# Patient Record
Sex: FEMALE | Race: BLACK OR AFRICAN AMERICAN | Employment: UNEMPLOYED | URBAN - METROPOLITAN AREA
[De-identification: names, ages, dates, MRNs, and addresses within clinical notes are randomized per-mention and may not be internally consistent; named-entity substitution may affect disease eponyms.]

---

## 2017-08-16 ENCOUNTER — HOSPITAL ENCOUNTER (EMERGENCY)
Age: 47
Discharge: HOME | End: 2017-08-16
Attending: EMERGENCY MEDICINE
Payer: SELF-PAY

## 2017-08-16 VITALS
RESPIRATION RATE: 18 BRPM | OXYGEN SATURATION: 100 % | HEIGHT: 65 IN | SYSTOLIC BLOOD PRESSURE: 191 MMHG | WEIGHT: 293 LBS | DIASTOLIC BLOOD PRESSURE: 105 MMHG | BODY MASS INDEX: 48.82 KG/M2 | HEART RATE: 100 BPM | TEMPERATURE: 99.1 F

## 2017-08-16 DIAGNOSIS — N93.9 ABNORMAL UTERINE BLEEDING (AUB): Primary | ICD-10-CM

## 2017-08-16 DIAGNOSIS — R03.0 ELEVATED BLOOD PRESSURE READING: ICD-10-CM

## 2017-08-16 LAB
ANION GAP BLD CALC-SCNC: 17 MMOL/L (ref 10–20)
BUN BLD-MCNC: 9 MG/DL (ref 7–18)
CA-I BLD-MCNC: 1.2 MMOL/L (ref 1.12–1.32)
CHLORIDE BLD-SCNC: 101 MMOL/L (ref 100–108)
CO2 BLD-SCNC: 26 MMOL/L (ref 19–24)
CREAT UR-MCNC: 0.8 MG/DL (ref 0.6–1.3)
GLUCOSE BLD STRIP.AUTO-MCNC: 105 MG/DL (ref 74–106)
HCG UR QL: NEGATIVE
HCT VFR BLD CALC: 36 % (ref 36–49)
HGB BLD-MCNC: 12.2 G/DL (ref 12–16)
POTASSIUM BLD-SCNC: 3.9 MMOL/L (ref 3.5–5.5)
SERVICE CMNT-IMP: NORMAL
SODIUM BLD-SCNC: 139 MMOL/L (ref 136–145)
WET PREP GENITAL: NORMAL

## 2017-08-16 PROCEDURE — 80047 BASIC METABLC PNL IONIZED CA: CPT

## 2017-08-16 PROCEDURE — 87491 CHLMYD TRACH DNA AMP PROBE: CPT | Performed by: PHYSICIAN ASSISTANT

## 2017-08-16 PROCEDURE — 99284 EMERGENCY DEPT VISIT MOD MDM: CPT

## 2017-08-16 PROCEDURE — 81025 URINE PREGNANCY TEST: CPT | Performed by: EMERGENCY MEDICINE

## 2017-08-16 PROCEDURE — 87210 SMEAR WET MOUNT SALINE/INK: CPT | Performed by: PHYSICIAN ASSISTANT

## 2017-08-16 NOTE — Clinical Note
One or more blood pressure readings were noted elevated during your emergency department visit this date. Follow up for the elevated BP. Monitor and record BP readings to follow up with PCP.

## 2017-08-16 NOTE — ED TRIAGE NOTES
Provider in triage: vaginal bleeding for 4 weeks   Ordered: cbc hcg  DDx: dysfunctional uterine bleeding  Sent to (MAIN treatment area, FAST track): fast

## 2017-08-16 NOTE — DISCHARGE INSTRUCTIONS
Abnormal Uterine Bleeding: Care Instructions  Your Care Instructions  Abnormal uterine bleeding (AUB) is irregular bleeding from the uterus that is longer or heavier than usual or does not occur at your regular time. Sometimes it is caused by changes in hormone levels. It can also be caused by growths in the uterus, such as fibroids or polyps. Sometimes a cause cannot be found. You may have heavy bleeding when you are not expecting your period. Your doctor may suggest a pregnancy test, if you think you are pregnant. Follow-up care is a key part of your treatment and safety. Be sure to make and go to all appointments, and call your doctor if you are having problems. It's also a good idea to know your test results and keep a list of the medicines you take. How can you care for yourself at home? · Be safe with medicines. Take pain medicines exactly as directed. ¨ If the doctor gave you a prescription medicine for pain, take it as prescribed. ¨ If you are not taking a prescription pain medicine, ask your doctor if you can take an over-the-counter medicine. · You may be low in iron because of blood loss. Eat a balanced diet that is high in iron and vitamin C. Foods rich in iron include red meat, shellfish, eggs, beans, and leafy green vegetables. Talk to your doctor about whether you need to take iron pills or a multivitamin. When should you call for help? Call 911 anytime you think you may need emergency care. For example, call if:  · You passed out (lost consciousness). Call your doctor now or seek immediate medical care if:  · You have sudden, severe pain in your belly or pelvis. · You have severe vaginal bleeding. You are soaking through your usual pads or tampons every hour for 2 or more hours. · You feel dizzy or lightheaded, or you feel like you may faint. Watch closely for changes in your health, and be sure to contact your doctor if:  · You have new belly or pelvic pain.   · You have a fever.  · Your bleeding gets worse or lasts longer than 1 week. · You think you may be pregnant. Where can you learn more? Go to http://mahin-megan.info/. Enter X746 in the search box to learn more about \"Abnormal Uterine Bleeding: Care Instructions. \"  Current as of: October 13, 2016  Content Version: 11.3  © 3762-1449 [employer]. Care instructions adapted under license by [employer] (which disclaims liability or warranty for this information). If you have questions about a medical condition or this instruction, always ask your healthcare professional. [place] [address] any warranty or liability for your use of this information.

## 2017-08-16 NOTE — ED PROVIDER NOTES
Patient is a 49 y.o. female presenting with vaginal bleeding. The history is provided by the patient. Vaginal Bleeding     Marga Rivera is a 49 y.o. female history of panic attacks, DM, presents with c/o vaginal bleeding for the past four weeks. States heavy bleeding at times. Denies fever or n/v.     Past Medical History:   Diagnosis Date    Panic attacks     Type II or unspecified type diabetes mellitus without mention of complication, not stated as uncontrolled     Vitamin D deficiency 2016       Past Surgical History:   Procedure Laterality Date    HX  SECTION  4965,5998    HX GYN      HX HEENT  Childhood    HX TUBAL LIGATION           Family History:   Problem Relation Age of Onset    Diabetes Mother     Diabetes Father     Hypertension Sister        Social History     Social History    Marital status: SINGLE     Spouse name: N/A    Number of children: N/A    Years of education: N/A     Occupational History    Not on file. Social History Main Topics    Smoking status: Never Smoker    Smokeless tobacco: Never Used      Comment: Pt counseled to continue to not smoke.  Alcohol use No    Drug use: No    Sexual activity: Yes     Partners: Male     Birth control/ protection: Surgical     Other Topics Concern    Not on file     Social History Narrative         ALLERGIES: Review of patient's allergies indicates no known allergies. Review of Systems   Genitourinary: Positive for vaginal bleeding. Constitutional:  Denies malaise, fever, chills. Head:  Denies injury. Face:  Denies injury or pain. ENMT:  Denies sore throat. Neck:  Denies injury or pain. Chest:  Denies injury. Cardiac:  Denies chest pain or palpitations. Respiratory:  Denies cough, wheezing, difficulty breathing, shortness of breath. GI/ABD:  Denies injury, pain, distention, nausea, vomiting, diarrhea. :  Vaginal bleeding Denies injury, pain, dysuria or urgency.    Back:  Denies injury or pain.   Pelvis:  Denies injury or pain. Extremity/MS:  Denies injury or pain. Neuro:  Denies headache, LOC, dizziness, neurologic symptoms/deficits/paresthesias. Skin: Denies injury, rash, itching or skin changes. Vitals:    08/16/17 1911   BP: (!) 191/105   Pulse: 100   Resp: 18   Temp: 99.1 °F (37.3 °C)   SpO2: 100%   Weight: 149.7 kg (330 lb)   Height: 5' 5\" (1.651 m)            Physical Exam   Nursing note and vitals reviewed. CONSTITUTIONAL: Alert, in no apparent distress; well-developed; well-nourished. HEAD:  Normocephalic, atraumatic. EYES: PERRL; EOM's intact. ENTM: Nose: No rhinorrhea; Throat: mucous membranes moist. Posterior pharynx-normal.  Neck:  No JVD, supple without lymphadenopathy. RESP: Chest clear, equal breath sounds. CV: S1 and S2 WNL; No murmurs, gallops or rubs. GI: Abdomen soft and non-tender. No masses or organomegaly. UPPER EXT:  Normal inspection. LOWER EXT: Normal inspection. NEURO: strength 5/5 and sym, sensation intact. SKIN: No rashes; Normal for age and stage. PSYCH:  Alert and oriented, normal affect. MDM  Number of Diagnoses or Management Options  Diagnosis management comments: DDx: preg, ectopic, miscarriage, menstruation, ovarian cyst, ovarian torsion, tubo-ovarian abscess, fibroids, UTI, pyelo, kidney stones, STD,  Vxoc-Enqm-Exdvjc syndrome, hemorroids, dehydration,anemia, malignancy         Amount and/or Complexity of Data Reviewed  Clinical lab tests: ordered and reviewed      ED Course       Pelvic Exam  Date/Time: 8/16/2017 7:43 PM  Performed by: PA  Exam assisted by:  Nurse. Type of exam performed: bimanual and speculum. External genitalia appearance: normal.    Vaginal exam:  bleeding. Bleeding: mild  Cervical exam:  normal.    Specimen(s) collected:  GC.   Bimanual exam:  normal.            Vitals:  Patient Vitals for the past 12 hrs:   Temp Pulse Resp BP SpO2   08/16/17 1911 99.1 °F (37.3 °C) 100 18 (!) 191/105 100 % Medications ordered:   Medications - No data to display      Lab findings:  Recent Results (from the past 12 hour(s))   HCG URINE, QL    Collection Time: 08/16/17  7:10 PM   Result Value Ref Range    HCG urine, Ql. NEGATIVE  NEG     WET PREP    Collection Time: 08/16/17  7:30 PM   Result Value Ref Range    Special Requests: NO SPECIAL REQUESTS      Wet prep NO YEAST,TRICHOMONAS OR CLUE CELLS NOTED         EKG interpretation by ED Physician:      X-Ray, CT or other radiology findings or impressions:  No orders to display       Progress notes, Consult notes or additional Procedure notes:       Disposition:  Diagnosis:   1. Abnormal uterine bleeding (AUB)    2. Elevated blood pressure reading        Disposition:   8:04 PM  Pt reevaluated at this time and is resting comfortably in NAD. Discussed results and findings, as well as, diagnosis and plan for discharge. Pt verbalizes understanding and agreement with plan. All questions addressed at this time.      Follow-up Information     Follow up With Details Comments Contact Info    Sina Roa MD Schedule an appointment as soon as possible for a visit in 3 days  [facility] [address]  [facility] [address] [facility] Linnette Reilly DO Schedule an appointment as soon as possible for a visit in 2 days  [id number] [facility]  [address] [facility] EMERGENCY DEPT  If symptoms worsen [address]  [phone]           Patient's Medications   Start Taking    No medications on file   Continue Taking    No medications on file   These Medications have changed    No medications on file   Stop Taking    BLOOD-GLUCOSE METER (FREESTYLE LITE METER) MONITORING KIT    Check fasting sugars daily before breakfast DX code:250.00 (freedom freestyle liter meter)    GLUCOSE BLOOD VI TEST STRIPS (FREESTYLE TEST) STRIP    Check fasting sugars daily before breakfast DX code:250.00 (freedom freestyle liter meter)    HYDROXYZINE (VISTARIL) 25 MG CAPSULE    Take 1 Cap by mouth three (3) times daily as needed for Itching.     LANCETS MISC    Check fasting sugars daily before breakfast DX code:250.00 (freedom freestyle liter meter)

## 2017-08-17 LAB
C TRACH RRNA SPEC QL NAA+PROBE: NEGATIVE
N GONORRHOEA RRNA SPEC QL NAA+PROBE: NEGATIVE
SPECIMEN SOURCE: NORMAL

## 2017-10-03 ENCOUNTER — HOSPITAL ENCOUNTER (EMERGENCY)
Age: 47
Discharge: HOME | End: 2017-10-03
Attending: EMERGENCY MEDICINE
Payer: SELF-PAY

## 2017-10-03 VITALS
RESPIRATION RATE: 18 BRPM | SYSTOLIC BLOOD PRESSURE: 139 MMHG | DIASTOLIC BLOOD PRESSURE: 86 MMHG | HEIGHT: 65 IN | TEMPERATURE: 98.3 F | OXYGEN SATURATION: 99 % | WEIGHT: 293 LBS | BODY MASS INDEX: 48.82 KG/M2 | HEART RATE: 96 BPM
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
## 2017-10-03 DIAGNOSIS — S39.012A BACK STRAIN, INITIAL ENCOUNTER: Primary | ICD-10-CM

## 2017-10-03 PROCEDURE — 99282 EMERGENCY DEPT VISIT SF MDM: CPT

## 2017-10-03 RX ORDER — CYCLOBENZAPRINE HCL 5 MG
10 TABLET ORAL 3 TIMES DAILY
Qty: 18 TAB | Refills: 0 | Status: SHIPPED | OUTPATIENT
Start: 2017-10-03 | End: 2017-10-06

## 2017-10-03 RX ORDER — IBUPROFEN 800 MG/1
800 TABLET ORAL EVERY 8 HOURS
Qty: 15 TAB | Refills: 0 | Status: SHIPPED | OUTPATIENT
Start: 2017-10-03 | End: 2017-10-08
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
## 2017-10-04 ENCOUNTER — TELEPHONE (OUTPATIENT)
Dept: INTERNAL MEDICINE CLINIC | Age: 47
End: 2017-10-04

## 2017-10-04 NOTE — TELEPHONE ENCOUNTER
2 pt. Identifiers confirmed. Pt. Notified of below. PT. Transferred to make ER f/u. No other questions/concerns at this time.

## 2017-10-04 NOTE — ED NOTES
Pt discharged to home ambulatory and in company of self  Discharge instructions provided via discussion and handout. Teaching to patient   Verbalized understanding. No questions voiced. Discharged with 2 RX.

## 2017-10-09 ENCOUNTER — OFFICE VISIT (OUTPATIENT)
Dept: INTERNAL MEDICINE CLINIC | Age: 47
End: 2017-10-09

## 2017-10-09 VITALS
HEIGHT: 65 IN | BODY MASS INDEX: 48.82 KG/M2 | HEART RATE: 78 BPM | OXYGEN SATURATION: 100 % | DIASTOLIC BLOOD PRESSURE: 81 MMHG | RESPIRATION RATE: 18 BRPM | SYSTOLIC BLOOD PRESSURE: 125 MMHG | TEMPERATURE: 97.5 F | WEIGHT: 293 LBS

## 2017-10-09 DIAGNOSIS — E11.9 DIABETES MELLITUS, STABLE (HCC): ICD-10-CM

## 2017-10-09 DIAGNOSIS — M54.9 MID BACK PAIN ON RIGHT SIDE: Primary | ICD-10-CM

## 2017-10-09 DIAGNOSIS — M72.2 PLANTAR FASCIITIS OF RIGHT FOOT: ICD-10-CM

## 2017-10-09 RX ORDER — IBUPROFEN 800 MG/1
800 TABLET ORAL
COMMUNITY
End: 2018-02-26

## 2017-10-09 RX ORDER — IBUPROFEN 200 MG
800 TABLET ORAL
COMMUNITY
End: 2017-10-09 | Stop reason: CLARIF

## 2017-10-09 RX ORDER — CYCLOBENZAPRINE HCL 5 MG
5 TABLET ORAL
COMMUNITY
End: 2018-02-26
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
## 2017-10-09 NOTE — PROGRESS NOTES
ROOM # 2    Marylen Peasant presents today for   Chief Complaint   Patient presents with    Back Pain     [facility] f/u for back pain    Foot Pain     right foot pain       Marylen Peasant preferred language for health care discussion is english/other. Is someone accompanying this pt? no    Is the patient using any DME equipment during OV? no    Depression Screening:  PHQ over the last two weeks 10/9/2017 7/19/2016 4/15/2016 2/13/2015   Little interest or pleasure in doing things Not at all Not at all Not at all Not at all   Feeling down, depressed or hopeless Not at all Not at all Not at all Not at all   Total Score PHQ 2 0 0 0 0       Learning Assessment:  Learning Assessment 2/13/2015   PRIMARY LEARNER Patient   HIGHEST LEVEL OF EDUCATION - PRIMARY LEARNER  DID NOT GRADUATE [facility] PRIMARY LEARNER NONE   CO-LEARNER CAREGIVER No   PRIMARY LANGUAGE ENGLISH   LEARNER PREFERENCE PRIMARY DEMONSTRATION   ANSWERED BY Patient   RELATIONSHIP SELF       Abuse Screening:  Abuse Screening Questionnaire 10/9/2017   Do you ever feel afraid of your partner? N   Are you in a relationship with someone who physically or mentally threatens you? N   Is it safe for you to go home? Y       Fall Risk  No flowsheet data found. Health Maintenance reviewed and discussed per provider. Yes    Marylen Peasant is due for eye exam (record request), foot exam, lipid, a1c, microalbumin . Please order/place referral if appropriate. Advance Directive:  1. Do you have an advance directive in place? Patient Reply: no    2. If not, would you like material regarding how to put one in place? Patient Reply: no    Coordination of Care:  1. Have you been to the ER, urgent care clinic since your last visit? Hospitalized since your last visit? [facility] for back pain    2. Have you seen or consulted any other health care providers outside of the [address] [place] [address] since your last visit? Include any pap smears or colon screening.  no

## 2017-10-09 NOTE — MR AVS SNAPSHOT
Visit Information Date & Time Provider Department Dept. Phone Encounter #  
 10/9/2017  8:30 AM Stewart Crowell MD [facility] [phone] [id number] Follow-up Instructions Return in about 1 month (around 11/9/2017) for f/u DM. Upcoming Health Maintenance Date Due  
 FOOT EXAM Q1 4/17/1979 EYE EXAM RETINAL OR DILATED Q1 4/17/1979 HEMOGLOBIN A1C Q6M 10/15/2016 MICROALBUMIN Q1 4/15/2017 LIPID PANEL Q1 4/15/2017 Pneumococcal 19-64 Medium Risk (1 of 1 - PPSV23) 1/1/2018* DTaP/Tdap/Td series (1 - Tdap) 1/1/2018* PAP AKA CERVICAL CYTOLOGY 3/18/2018 *Topic was postponed. The date shown is not the original due date. Allergies as of 10/9/2017  Review Complete On: 10/9/2017 By: Stewart Crowell MD  
 No Known Allergies Current Immunizations  Reviewed on 7/19/2016 Name Date  
 TB Skin Test (PPD) Intradermal 9/20/2017 12:00 AM  
  
 Not reviewed this visit You Were Diagnosed With   
  
 Codes Comments Mid back pain on right side    -  Primary ICD-10-CM: M54.9 ICD-9-CM: 724.5 Plantar fasciitis of right foot     ICD-10-CM: M72.2 ICD-9-CM: 728.71 Vitals BP Pulse Temp Resp Height(growth percentile) Weight(growth percentile) 125/81 (BP 1 Location: Right arm, BP Patient Position: Sitting) 78 97.5 °F (36.4 °C) (Oral) 18 5' 5\" (1.651 m) 342 lb 6.4 oz (155.3 kg) SpO2 BMI OB Status Smoking Status 100% 56.98 kg/m2 Unknown Never Smoker Vitals History BMI and BSA Data Body Mass Index Body Surface Area 56.98 kg/m 2 2.67 m 2 Preferred Pharmacy Pharmacy Name Phone [id number] [place], [address] [place] [address] [phone] Your Updated Medication List  
  
   
This list is accurate as of: 10/9/17  8:47 AM.  Always use your most recent med list.  
  
  
  
  
 cyclobenzaprine 5 mg tablet Commonly known as:  FLEXERIL  
 Take 5 mg by mouth every six (6) hours as needed for Muscle Spasm(s). ibuprofen 800 mg tablet Commonly known as:  MOTRIN Take 800 mg by mouth every eight (8) hours as needed for Pain. Follow-up Instructions Return in about 1 month (around 11/9/2017) for f/u DM. Patient Instructions 1) get good foot support, Dr. Hernandez Syed pads/ 
 
2) use heating pad, icy/hot, tiger balm for pain. 3) Take  Ibuprofen With food. If you notice any blood/black tarry stools, diarrhea, abdominal pain, nausea, vomiting then stop medication immediately. Give us a call ASAP. Back Stretches: Exercises Your Care Instructions Here are some examples of exercises for stretching your back. Start each exercise slowly. Ease off the exercise if you start to have pain. Your doctor or physical therapist will tell you when you can start these exercises and which ones will work best for you. How to do the exercises Overhead stretch 1. Stand comfortably with your feet shoulder-width apart. 2. Looking straight ahead, raise both arms over your head and reach toward the ceiling. Do not allow your head to tilt back. 3. Hold for 15 to 30 seconds, then lower your arms to your sides. 4. Repeat 2 to 4 times. Side stretch 1. Stand comfortably with your feet shoulder-width apart. 2. Raise one arm over your head, and then lean to the other side. 3. Slide your hand down your leg as you let the weight of your arm gently stretch your side muscles. Hold for 15 to 30 seconds. 4. Repeat 2 to 4 times on each side. Press-up 1. Lie on your stomach, supporting your body with your forearms. 2. Press your elbows down into the floor to raise your upper back. As you do this, relax your stomach muscles and allow your back to arch without using your back muscles. As your press up, do not let your hips or pelvis come off the floor. 3. Hold for 15 to 30 seconds, then relax. 4. Repeat 2 to 4 times. Relax and rest 
 
1. Lie on your back with a rolled towel under your neck and a pillow under your knees. Extend your arms comfortably to your sides. 2. Relax and breathe normally. 3. Remain in this position for about 10 minutes. 4. If you can, do this 2 or 3 times each day. Follow-up care is a key part of your treatment and safety. Be sure to make and go to all appointments, and call your doctor if you are having problems. It's also a good idea to know your test results and keep a list of the medicines you take. Where can you learn more? Go to http://mahin-megan.info/. Enter V128 in the search box to learn more about \"Back Stretches: Exercises. \" Current as of: March 21, 2017 Content Version: 11.3 © 3369-9895 [employer]. Care instructions adapted under license by [employer] (which disclaims liability or warranty for this information). If you have questions about a medical condition or this instruction, always ask your healthcare professional. [place] [address] any warranty or liability for your use of this information. Healthy Upper Back: Exercises Your Care Instructions Here are some examples of exercises for your upper back. Start each exercise slowly. Ease off the exercise if you start to have pain. Your doctor or physical therapist will tell you when you can start these exercises and which ones will work best for you. How to do the exercises Lower neck and upper back stretch 5. Stretch your arms out in front of your body. Clasp one hand on top of your other hand. 6. Gently reach out so that you feel your shoulder blades stretching away from each other. 7. Gently bend your head forward. 8. Hold for 15 to 30 seconds. 9. Repeat 2 to 4 times. Midback stretch Note: If you have knee pain, do not do this exercise. 5. Kneel on the floor, and sit back on your ankles. 6. Lean forward, place your hands on the floor, and stretch your arms out in front of you. Rest your head between your arms. 7. Gently push your chest toward the floor, reaching as far in front of you as possible. 8. Hold for 15 to 30 seconds. 9. Repeat 2 to 4 times. Shoulder rolls 5. Sit comfortably with your feet shoulder-width apart. You can also do this exercise while standing. 6. Roll your shoulders up, then back, and then down in a smooth, circular motion. 7. Repeat 2 to 4 times. Wall push-up 5. Stand against a wall with your feet about 12 to 24 inches back from the wall. If you feel any pain when you do this exercise, stand closer to the wall. 6. Place your hands on the wall slightly wider apart than your shoulders, and lean forward. 7. Gently lean your body toward the wall. Then push back to your starting position. Keep the motion smooth and controlled. 8. Repeat 8 to 12 times. Resisted shoulder blade squeeze Note: For this exercise, you will need elastic exercise material, such as surgical tubing or Thera-Band. 1. Sit or stand, holding the band in both hands in front of you. Keep your elbows close to your sides, bent at a 90-degree angle. Your palms should face up. 2. Squeeze your shoulder blades together, and move your arms to the outside, stretching the band. Be sure to keep your elbows at your sides while you do this. 3. Relax. 4. Repeat 8 to 12 times. Resisted rows Note: For this exercise, you will need elastic exercise material, such as surgical tubing or Thera-Band. 1. Put the band around a solid object, such as a bedpost, at about waist level. Hold one end of the band in each hand. 2. With your elbows at your sides and bent to 90 degrees, pull the band back to move your shoulder blades toward each other. Return to the starting position. 3. Repeat 8 to 12 times. Follow-up care is a key part of your treatment and safety.  Be sure to make and go to all appointments, and call your doctor if you are having problems. It's also a good idea to know your test results and keep a list of the medicines you take. Where can you learn more? Go to http://mahin-megan.info/. Enter H979 in the search box to learn more about \"Healthy Upper Back: Exercises. \" Current as of: March 21, 2017 Content Version: 11.3 © 8309-0445 [employer]. Care instructions adapted under license by [employer] (which disclaims liability or warranty for this information). If you have questions about a medical condition or this instruction, always ask your healthcare professional. [address] any warranty or liability for your use of this information. Plantar Fasciitis: Care Instructions Your Care Instructions Plantar fasciitis is pain and inflammation of the plantar fascia, the tissue at the bottom of your foot that connects the heel bone to the toes. The plantar fascia also supports the arch. If you strain the plantar fascia, it can develop small tears and cause heel pain when you stand or walk. Plantar fasciitis can be caused by running or other sports. It also may occur in people who are overweight or who have high arches or flat feet. You may get plantar fasciitis if you walk or stand for long periods, or have a tight Achilles tendon or calf muscles. You can improve your foot pain with rest and other care at home. It might take a few weeks to a few months for your foot to heal completely. Follow-up care is a key part of your treatment and safety. Be sure to make and go to all appointments, and call your doctor if you are having problems. It's also a good idea to know your test results and keep a list of the medicines you take. How can you care for yourself at home? · Rest your feet often. Reduce your activity to a level that lets you avoid pain. If possible, do not run or walk on hard surfaces. · Take pain medicines exactly as directed. ¨ If the doctor gave you a prescription medicine for pain, take it as prescribed. ¨ If you are not taking a prescription pain medicine, take an over-the-counter anti-inflammatory medicine for pain and swelling, such as ibuprofen (Advil, Motrin) or naproxen (Aleve). Read and follow all instructions on the label. · Use ice massage to help with pain and swelling. You can use an ice cube or an ice cup several times a day. To make an ice cup, fill a paper cup with water and freeze it. Cut off the top of the cup until a half-inch of ice shows. Hold onto the remaining paper to use the cup. Rub the ice in small circles over the area for 5 to 7 minutes. · Contrast baths, which alternate hot and cold water, can also help reduce swelling. But because heat alone may make pain and swelling worse, end a contrast bath with a soak in cold water. · Wear a night splint if your doctor suggests it. A night splint holds your foot with the toes pointed up and the foot and ankle at a 90-degree angle. This position gives the bottom of your foot a constant, gentle stretch. · Do simple exercises such as calf stretches and towel stretches 2 to 3 times each day, especially when you first get up in the morning. These can help the plantar fascia become more flexible. They also make the muscles that support your arch stronger. Hold these stretches for 15 to 30 seconds per stretch. Repeat 2 to 4 times. ¨ Stand about 1 foot from a wall. Place the palms of both hands against the wall at chest level. Lean forward against the wall, keeping one leg with the knee straight and heel on the ground while bending the knee of the other leg. ¨ Sit down on the floor or a mat with your feet stretched in front of you. Roll up a towel lengthwise, and loop it over the ball of your foot. Holding the towel at both ends, gently pull the towel toward you to stretch your foot. · Wear shoes with good arch support. Athletic shoes or shoes with a well-cushioned sole are good choices. · Try heel cups or shoe inserts (orthotics) to help cushion your heel. You can buy these at many shoe stores. · Put on your shoes as soon as you get out of bed. Going barefoot or wearing slippers may make your pain worse. · Reach and stay at a good weight for your height. This puts less strain on your feet. When should you call for help? Call your doctor now or seek immediate medical care if: 
· You have heel pain with fever, redness, or warmth in your heel. · You cannot put weight on the sore foot. Watch closely for changes in your health, and be sure to contact your doctor if: 
· You have numbness or tingling in your heel. · Your heel pain lasts more than 2 weeks. Where can you learn more? Go to http://mahinTerressentiamegan.info/. Jackson Been in the search box to learn more about \"Plantar Fasciitis: Care Instructions. \" Current as of: March 21, 2017 Content Version: 11.3 © 2199-0818 Kiddie Kist. Care instructions adapted under license by [employer] (which disclaims liability or warranty for this information). If you have questions about a medical condition or this instruction, always ask your healthcare professional. [address] any warranty or liability for your use of this information. Plantar Fasciitis: Exercises Your Care Instructions Here are some examples of typical rehabilitation exercises for your condition. Start each exercise slowly. Ease off the exercise if you start to have pain. Your doctor or physical therapist will tell you when you can start these exercises and which ones will work best for you. How to do the exercises Note: Each exercise should create a pulling feeling but should not cause pain. Towel stretch 10. Sit with your legs extended and knees straight. 11. Place a towel around your foot just under the toes. 12. Hold each end of the towel in each hand, with your hands above your knees. 13. Pull back with the towel so that your foot stretches toward you. 14. Hold the position for at least 15 to 30 seconds. 15. Repeat 2 to 4 times a session, up to 5 sessions a day. Calf stretch Note: This exercise stretches the muscles at the back of the lower leg (the calf) and the Achilles tendon. Do this exercise 3 or 4 times a day, 5 days a week. 10. Stand facing a wall with your hands on the wall at about eye level. Put the leg you want to stretch about a step behind your other leg. 11. Keeping your back heel on the floor, bend your front knee until you feel a stretch in the back leg. 12. Hold the stretch for 15 to 30 seconds. Repeat 2 to 4 times. Plantar fascia and calf stretch Note: Stretching the plantar fascia and calf muscles can increase flexibility and decrease heel pain. You can do this exercise several times each day and before and after activity. 8. Stand on a step as shown above. Be sure to hold on to the banister. 9. Slowly let your heels down over the edge of the step as you relax your calf muscles. You should feel a gentle stretch across the bottom of your foot and up the back of your leg to your knee. 10. Hold the stretch about 15 to 30 seconds, and then tighten your calf muscle a little to bring your heel back up to the level of the step. Repeat 2 to 4 times. Towel curls 9. While sitting, place your foot on a towel on the floor and scrunch the towel toward you with your toes. 10. Then, also using your toes, push the towel away from you. Note: Make this exercise more challenging by placing a weighted object, such as a soup can, on the other end of the towel. [place] pickups 5. Put marbles on the floor next to a cup. 
6. Using your toes, try to lift the marbles up from the floor and put them in the cup. Follow-up care is a key part of your treatment and safety. Be sure to make and go to all appointments, and call your doctor if you are having problems. It's also a good idea to know your test results and keep a list of the medicines you take. Where can you learn more? Go to http://mahin-megan.info/. Ceci Or in the search box to learn more about \"Plantar Fasciitis: Exercises. \" Current as of: March 21, 2017 Content Version: 11.3 © 0129-1058 [employer]. Care instructions adapted under license by [employer] (which disclaims liability or warranty for this information). If you have questions about a medical condition or this instruction, always ask your healthcare professional. [address] any warranty or liability for your use of this information. Introducing [facility]! Sarah Rodriguez introduces [employer] patient portal. Now you can access parts of your medical record, email your doctor's office, and request medication refills online. 1. In your internet browser, go to [email or web address] 2. Click on the First Time User? Click Here link in the Sign In box. You will see the New Member Sign Up page. 3. Enter your [employer] Access Code exactly as it appears below. You will not need to use this code after youve completed the sign-up process. If you do not sign up before the expiration date, you must request a new code. · [employer] Access Code: [email or web address] Expires: 11/14/2017  8:04 PM 
 
4. Enter the last four digits of your Social Security Number (xxxx) and Date of Birth (mm/dd/yyyy) as indicated and click Submit. You will be taken to the next sign-up page. 5. Create a Evergramt ID. This will be your [employer] login ID and cannot be changed, so think of one that is secure and easy to remember. 6. Create a Evergramt password. You can change your password at any time. 7. Enter your Password Reset Question and Answer. This can be used at a later time if you forget your password. 8. Enter your e-mail address. You will receive e-mail notification when new information is available in [address] 9. Click Sign Up. You can now view and download portions of your medical record. 10. Click the Download Summary menu link to download a portable copy of your medical information. If you have questions, please visit the Frequently Asked Questions section of the B[employer]park website. Remember, [employer] is NOT to be used for urgent needs. For medical emergencies, dial 911. Now available from your iPhone and Android! Please provide this summary of care documentation to your next provider. Your primary care clinician is listed as Sina Roa. If you have any questions after today's visit, please call [phone].

## 2017-10-09 NOTE — PATIENT INSTRUCTIONS
1) get good foot support, Dr. Geni Barragan pads/    2) use heating pad, icy/hot, tiger balm for pain. 3) Take  Ibuprofen With food. If you notice any blood/black tarry stools, diarrhea, abdominal pain, nausea, vomiting then stop medication immediately. Give us a call ASAP. Back Stretches: Exercises  Your Care Instructions  Here are some examples of exercises for stretching your back. Start each exercise slowly. Ease off the exercise if you start to have pain. Your doctor or physical therapist will tell you when you can start these exercises and which ones will work best for you. How to do the exercises  Overhead stretch    1. Stand comfortably with your feet shoulder-width apart. 2. Looking straight ahead, raise both arms over your head and reach toward the ceiling. Do not allow your head to tilt back. 3. Hold for 15 to 30 seconds, then lower your arms to your sides. 4. Repeat 2 to 4 times. Side stretch    1. Stand comfortably with your feet shoulder-width apart. 2. Raise one arm over your head, and then lean to the other side. 3. Slide your hand down your leg as you let the weight of your arm gently stretch your side muscles. Hold for 15 to 30 seconds. 4. Repeat 2 to 4 times on each side. Press-up    1. Lie on your stomach, supporting your body with your forearms. 2. Press your elbows down into the floor to raise your upper back. As you do this, relax your stomach muscles and allow your back to arch without using your back muscles. As your press up, do not let your hips or pelvis come off the floor. 3. Hold for 15 to 30 seconds, then relax. 4. Repeat 2 to 4 times. Relax and rest    1. Lie on your back with a rolled towel under your neck and a pillow under your knees. Extend your arms comfortably to your sides. 2. Relax and breathe normally. 3. Remain in this position for about 10 minutes. 4. If you can, do this 2 or 3 times each day.   Follow-up care is a key part of your treatment and safety. Be sure to make and go to all appointments, and call your doctor if you are having problems. It's also a good idea to know your test results and keep a list of the medicines you take. Where can you learn more? Go to http://mahin-megan.info/. Enter M721 in the search box to learn more about \"Back Stretches: Exercises. \"  Current as of: March 21, 2017  Content Version: 11.3  © 0024-4156 [employer]. Care instructions adapted under license by [employer] (which disclaims liability or warranty for this information). If you have questions about a medical condition or this instruction, always ask your healthcare professional. [address] any warranty or liability for your use of this information. Healthy Upper Back: Exercises  Your Care Instructions  Here are some examples of exercises for your upper back. Start each exercise slowly. Ease off the exercise if you start to have pain. Your doctor or physical therapist will tell you when you can start these exercises and which ones will work best for you. How to do the exercises  Lower neck and upper back stretch    5. Stretch your arms out in front of your body. Clasp one hand on top of your other hand. 6. Gently reach out so that you feel your shoulder blades stretching away from each other. 7. Gently bend your head forward. 8. Hold for 15 to 30 seconds. 9. Repeat 2 to 4 times. Midback stretch    Note: If you have knee pain, do not do this exercise. 5. Kneel on the floor, and sit back on your ankles. 6. Lean forward, place your hands on the floor, and stretch your arms out in front of you. Rest your head between your arms. 7. Gently push your chest toward the floor, reaching as far in front of you as possible. 8. Hold for 15 to 30 seconds. 9. Repeat 2 to 4 times. Shoulder rolls    5. Sit comfortably with your feet shoulder-width apart.  You can also do this exercise while standing. 6. Roll your shoulders up, then back, and then down in a smooth, circular motion. 7. Repeat 2 to 4 times. Wall push-up    5. Stand against a wall with your feet about 12 to 24 inches back from the wall. If you feel any pain when you do this exercise, stand closer to the wall. 6. Place your hands on the wall slightly wider apart than your shoulders, and lean forward. 7. Gently lean your body toward the wall. Then push back to your starting position. Keep the motion smooth and controlled. 8. Repeat 8 to 12 times. Resisted shoulder blade squeeze    Note: For this exercise, you will need elastic exercise material, such as surgical tubing or Thera-Band. 1. Sit or stand, holding the band in both hands in front of you. Keep your elbows close to your sides, bent at a 90-degree angle. Your palms should face up. 2. Squeeze your shoulder blades together, and move your arms to the outside, stretching the band. Be sure to keep your elbows at your sides while you do this. 3. Relax. 4. Repeat 8 to 12 times. Resisted rows    Note: For this exercise, you will need elastic exercise material, such as surgical tubing or Thera-Band. 1. Put the band around a solid object, such as a bedpost, at about waist level. Hold one end of the band in each hand. 2. With your elbows at your sides and bent to 90 degrees, pull the band back to move your shoulder blades toward each other. Return to the starting position. 3. Repeat 8 to 12 times. Follow-up care is a key part of your treatment and safety. Be sure to make and go to all appointments, and call your doctor if you are having problems. It's also a good idea to know your test results and keep a list of the medicines you take. Where can you learn more? Go to http://mahin-megan.info/. Enter Z333 in the search box to learn more about \"Healthy Upper Back: Exercises. \"  Current as of: March 21, 2017  Content Version: 11.3  © 0831-0329 [employer]. Care instructions adapted under license by [employer] (which disclaims liability or warranty for this information). If you have questions about a medical condition or this instruction, always ask your healthcare professional. [address] any warranty or liability for your use of this information. Plantar Fasciitis: Care Instructions  Your Care Instructions    Plantar fasciitis is pain and inflammation of the plantar fascia, the tissue at the bottom of your foot that connects the heel bone to the toes. The plantar fascia also supports the arch. If you strain the plantar fascia, it can develop small tears and cause heel pain when you stand or walk. Plantar fasciitis can be caused by running or other sports. It also may occur in people who are overweight or who have high arches or flat feet. You may get plantar fasciitis if you walk or stand for long periods, or have a tight Achilles tendon or calf muscles. You can improve your foot pain with rest and other care at home. It might take a few weeks to a few months for your foot to heal completely. Follow-up care is a key part of your treatment and safety. Be sure to make and go to all appointments, and call your doctor if you are having problems. It's also a good idea to know your test results and keep a list of the medicines you take. How can you care for yourself at home? · Rest your feet often. Reduce your activity to a level that lets you avoid pain. If possible, do not run or walk on hard surfaces. · Take pain medicines exactly as directed. ¨ If the doctor gave you a prescription medicine for pain, take it as prescribed. ¨ If you are not taking a prescription pain medicine, take an over-the-counter anti-inflammatory medicine for pain and swelling, such as ibuprofen (Advil, Motrin) or naproxen (Aleve). Read and follow all instructions on the label. · Use ice massage to help with pain and swelling.  You can use an ice cube or an ice cup several times a day. To make an ice cup, fill a paper cup with water and freeze it. Cut off the top of the cup until a half-inch of ice shows. Hold onto the remaining paper to use the cup. Rub the ice in small circles over the area for 5 to 7 minutes. · Contrast baths, which alternate hot and cold water, can also help reduce swelling. But because heat alone may make pain and swelling worse, end a contrast bath with a soak in cold water. · Wear a night splint if your doctor suggests it. A night splint holds your foot with the toes pointed up and the foot and ankle at a 90-degree angle. This position gives the bottom of your foot a constant, gentle stretch. · Do simple exercises such as calf stretches and towel stretches 2 to 3 times each day, especially when you first get up in the morning. These can help the plantar fascia become more flexible. They also make the muscles that support your arch stronger. Hold these stretches for 15 to 30 seconds per stretch. Repeat 2 to 4 times. ¨ Stand about 1 foot from a wall. Place the palms of both hands against the wall at chest level. Lean forward against the wall, keeping one leg with the knee straight and heel on the ground while bending the knee of the other leg. ¨ Sit down on the floor or a mat with your feet stretched in front of you. Roll up a towel lengthwise, and loop it over the ball of your foot. Holding the towel at both ends, gently pull the towel toward you to stretch your foot. · Wear shoes with good arch support. Athletic shoes or shoes with a well-cushioned sole are good choices. · Try heel cups or shoe inserts (orthotics) to help cushion your heel. You can buy these at many shoe stores. · Put on your shoes as soon as you get out of bed. Going barefoot or wearing slippers may make your pain worse. · Reach and stay at a good weight for your height. This puts less strain on your feet. When should you call for help?   Call your doctor now or seek immediate medical care if:  · You have heel pain with fever, redness, or warmth in your heel. · You cannot put weight on the sore foot. Watch closely for changes in your health, and be sure to contact your doctor if:  · You have numbness or tingling in your heel. · Your heel pain lasts more than 2 weeks. Where can you learn more? Go to http://mahin-megan.info/. Dean Comer in the search box to learn more about \"Plantar Fasciitis: Care Instructions. \"  Current as of: March 21, 2017  Content Version: 11.3  © 7113-7253 [employer]. Care instructions adapted under license by [employer] (which disclaims liability or warranty for this information). If you have questions about a medical condition or this instruction, always ask your healthcare professional. [address] any warranty or liability for your use of this information. Plantar Fasciitis: Exercises  Your Care Instructions  Here are some examples of typical rehabilitation exercises for your condition. Start each exercise slowly. Ease off the exercise if you start to have pain. Your doctor or physical therapist will tell you when you can start these exercises and which ones will work best for you. How to do the exercises  Note: Each exercise should create a pulling feeling but should not cause pain. Towel stretch    10. Sit with your legs extended and knees straight. 11. Place a towel around your foot just under the toes. 12. Hold each end of the towel in each hand, with your hands above your knees. 13. Pull back with the towel so that your foot stretches toward you. 14. Hold the position for at least 15 to 30 seconds. 15. Repeat 2 to 4 times a session, up to 5 sessions a day. Calf stretch    Note: This exercise stretches the muscles at the back of the lower leg (the calf) and the Achilles tendon.  Do this exercise 3 or 4 times a day, 5 days a week.  10. Stand facing a wall with your hands on the wall at about eye level. Put the leg you want to stretch about a step behind your other leg. 11. Keeping your back heel on the floor, bend your front knee until you feel a stretch in the back leg. 12. Hold the stretch for 15 to 30 seconds. Repeat 2 to 4 times. Plantar fascia and calf stretch    Note: Stretching the plantar fascia and calf muscles can increase flexibility and decrease heel pain. You can do this exercise several times each day and before and after activity. 8. Stand on a step as shown above. Be sure to hold on to the banister. 9. Slowly let your heels down over the edge of the step as you relax your calf muscles. You should feel a gentle stretch across the bottom of your foot and up the back of your leg to your knee. 10. Hold the stretch about 15 to 30 seconds, and then tighten your calf muscle a little to bring your heel back up to the level of the step. Repeat 2 to 4 times. Towel curls    9. While sitting, place your foot on a towel on the floor and scrunch the towel toward you with your toes. 10. Then, also using your toes, push the towel away from you. Note: Make this exercise more challenging by placing a weighted object, such as a soup can, on the other end of the towel. Melvin pickups    5. Put marbles on the floor next to a cup.  6. Using your toes, try to lift the marbles up from the floor and put them in the cup. Follow-up care is a key part of your treatment and safety. Be sure to make and go to all appointments, and call your doctor if you are having problems. It's also a good idea to know your test results and keep a list of the medicines you take. Where can you learn more? Go to http://mahin-megan.info/. Kristy Norris in the search box to learn more about \"Plantar Fasciitis: Exercises. \"  Current as of: March 21, 2017  Content Version: 11.3  © 1378-7364 [employer].  Care instructions adapted under license by [employer] (which disclaims liability or warranty for this information). If you have questions about a medical condition or this instruction, always ask your healthcare professional. [address] any warranty or liability for your use of this information.

## 2018-02-26 ENCOUNTER — HOSPITAL ENCOUNTER (EMERGENCY)
Age: 48
Discharge: HOME HEALTH | End: 2018-02-26
Attending: EMERGENCY MEDICINE
Payer: COMMERCIAL

## 2018-02-26 VITALS
SYSTOLIC BLOOD PRESSURE: 156 MMHG | WEIGHT: 293 LBS | HEART RATE: 89 BPM | OXYGEN SATURATION: 100 % | HEIGHT: 65 IN | BODY MASS INDEX: 48.82 KG/M2 | DIASTOLIC BLOOD PRESSURE: 98 MMHG | TEMPERATURE: 98.2 F | RESPIRATION RATE: 13 BRPM

## 2018-02-26 DIAGNOSIS — K08.89 PAIN, DENTAL: Primary | ICD-10-CM

## 2018-02-26 PROCEDURE — 99282 EMERGENCY DEPT VISIT SF MDM: CPT

## 2018-02-26 RX ORDER — HYDROCODONE BITARTRATE AND ACETAMINOPHEN 5; 325 MG/1; MG/1
TABLET ORAL
Qty: 6 TAB | Refills: 0 | Status: SHIPPED | OUTPATIENT
Start: 2018-02-26 | End: 2019-03-28

## 2018-02-26 RX ORDER — PENICILLIN V POTASSIUM 500 MG/1
500 TABLET, FILM COATED ORAL 4 TIMES DAILY
Qty: 28 TAB | Refills: 0 | Status: SHIPPED | OUTPATIENT
Start: 2018-02-26 | End: 2018-03-05

## 2018-02-27 NOTE — DISCHARGE INSTRUCTIONS
Tooth and Gum Pain: Care Instructions  Your Care Instructions    The most common causes of dental pain are tooth decay and gum disease. Pain can also be caused by an infection of the tooth (abscess) or the gums. Or you may have pain from a broken or cracked tooth. Other causes of pain include infection and damage to a tooth from nervous grinding of your teeth. A wisdom tooth can be painful when it is coming in but cannot break through the gum. It can also be painful when the tooth is only partway in and extra gum tissue has formed around it. The tissue can get inflamed (pericoronitis), and sometimes it gets infected. Prompt dental care can help find the cause of your toothache and keep the tooth from dying or gum disease from getting worse. Self-care at home may reduce your pain and discomfort. Follow-up care is a key part of your treatment and safety. Be sure to make and go to all appointments, and call your dentist or doctor if you are having problems. It's also a good idea to know your test results and keep a list of the medicines you take. How can you care for yourself at home? · To reduce pain and facial swelling, put an ice or cold pack on the outside of your cheek for 10 to 20 minutes at a time. Put a thin cloth between the ice and your skin. Do not use heat. · If your doctor prescribed antibiotics, take them as directed. Do not stop taking them just because you feel better. You need to take the full course of antibiotics. · Ask your doctor if you can take an over-the-counter pain medicine, such as acetaminophen (Tylenol), ibuprofen (Advil, Motrin), or naproxen (Aleve). Be safe with medicines. Read and follow all instructions on the label. · Avoid very hot, cold, or sweet foods and drinks if they increase your pain. · Rinse your mouth with warm salt water every 2 hours to help relieve pain and swelling. Mix 1 teaspoon of salt in 8 ounces of water.   · Talk to your dentist about using special toothpaste for sensitive teeth. To reduce pain on contact with heat or cold or when brushing, brush with this toothpaste regularly or rub a small amount of the paste on the sensitive area with a clean finger 2 or 3 times a day. Floss gently between your teeth. · Do not smoke or use spit tobacco. Tobacco use can make gum problems worse, decreases your ability to fight infection in your gums, and delays healing. If you need help quitting, talk to your doctor about stop-smoking programs and medicines. These can increase your chances of quitting for good. When should you call for help? Call 911 anytime you think you may need emergency care. For example, call if:  ? · You have trouble breathing. ?Call your dentist or doctor now or seek immediate medical care if:  ? · You have signs of infection, such as:  ¨ Increased pain, swelling, warmth, or redness. ¨ Red streaks leading from the area. ¨ Pus draining from the area. ¨ A fever. ? Watch closely for changes in your health, and be sure to contact your doctor if:  ? · You do not get better as expected. Where can you learn more? Go to http://mahin-megan.info/. Enter [id number] [id number] in the search box to learn more about \"Tooth and Gum Pain: Care Instructions. \"  Current as of: May 12, 2017  Content Version: 11.4  © 1127-5676 Healthwise, Incorporated. Care instructions adapted under license by [employer] (which disclaims liability or warranty for this information). If you have questions about a medical condition or this instruction, always ask your healthcare professional. [place] [address] any warranty or liability for your use of this information.

## 2018-02-27 NOTE — ED PROVIDER NOTES
EMERGENCY DEPARTMENT HISTORY AND PHYSICAL EXAM    Date: 2018  Patient Name: Yun Centeno    History of Presenting Illness     Chief Complaint   Patient presents with    Dental Pain         History Provided By: Patient    Chief Complaint: dental pain   Duration: 2 days   Timing:  Constant  Location: lower right   Severity: 9 out of 10  Modifying Factors: denies taking pain meds as Bryan Donovan knows they aren't gonna do nothing\"  Associated Symptoms: denies any other associated signs or symptoms      Additional History (Context): Yun Centeno is a 49 y.o. female with diabetes who presents with constant lower right sided dental pain that started 2 days ago. Denies taking anything for pain as Bryan Donovan knows they aren't gonna do nothing\". Denies any other associated signs and sx. She rates her pain at a severity of 9/10. Pt has no shx of tobacco or alcohol use. PCP: Papo Thomas MD        Past History     Past Medical History:  Past Medical History:   Diagnosis Date    Panic attacks     Type II or unspecified type diabetes mellitus without mention of complication, not stated as uncontrolled     Vitamin D deficiency 2016       Past Surgical History:  Past Surgical History:   Procedure Laterality Date    HX  SECTION  ,    HX GYN      HX HEENT  Childhood    HX TUBAL LIGATION         Family History:  Family History   Problem Relation Age of Onset    Diabetes Mother     Diabetes Father     Hypertension Sister        Social History:  Social History   Substance Use Topics    Smoking status: Never Smoker    Smokeless tobacco: Never Used      Comment: Pt counseled to continue to not smoke.  Alcohol use No       Allergies:  No Known Allergies      Review of Systems   Review of Systems   Constitutional: Negative for fever. HENT: Positive for dental problem. Neurological: Negative for headaches. All other systems reviewed and are negative.     All Other Systems Negative  Physical Exam     Vitals:    02/26/18 1914   BP: (!) 156/98   Pulse: 89   Resp: 13   Temp: 98.2 °F (36.8 °C)   SpO2: 100%   Weight: 147.4 kg (325 lb)   Height: 5' 5\" (1.651 m)     Physical Exam   Constitutional: She is oriented to person, place, and time. She appears well-developed and well-nourished. No distress. HENT:   Head: Normocephalic and atraumatic. Mouth/Throat: Uvula is midline, oropharynx is clear and moist and mucous membranes are normal. No trismus in the jaw. Abnormal dentition. Dental caries present. No dental abscesses. Eyes: Conjunctivae are normal.   Neck: Normal range of motion. Neck supple. Cardiovascular: Normal rate, regular rhythm and normal heart sounds. Pulmonary/Chest: Effort normal and breath sounds normal. No respiratory distress. She has no wheezes. She has no rales. Musculoskeletal: Normal range of motion. Neurological: She is alert and oriented to person, place, and time. Skin: Skin is warm and dry. Psychiatric: She has a normal mood and affect. Her behavior is normal. Judgment and thought content normal.   Nursing note and vitals reviewed. Diagnostic Study Results     Labs -   No results found for this or any previous visit (from the past 12 hour(s)). Radiologic Studies -   No orders to display     CT Results  (Last 48 hours)    None        CXR Results  (Last 48 hours)    None            Medical Decision Making   I am the first provider for this patient. I reviewed the vital signs, available nursing notes, past medical history, past surgical history, family history and social history. Records Reviewed: Nursing Notes    Procedures:  Procedures    Provider Notes (Medical Decision Making):     DDX: caries vs abscess    Pt well appearing and in NAD. Affected tooth is loose, no surrounding gingival swelling. Will start on abx, refer to dentist.    MED RECONCILIATION:  No current facility-administered medications for this encounter.       Current Outpatient Prescriptions   Medication Sig    penicillin v potassium (VEETID) 500 mg tablet Take 1 Tab by mouth four (4) times daily for 7 days.  HYDROcodone-acetaminophen (NORCO) 5-325 mg per tablet Take 1-2 tablets PO every 4-6 hours as needed for pain control. If over the counter ibuprofen or acetaminophen was suggested, then only take the vicodin for pain not well controlled with the over the counter medication. Disposition:  home    DISCHARGE NOTE:     Care plan outlined and precautions discussed. All medications were reviewed with the patient; will d/c home with abx, pain meds. All of pt's questions and concerns were addressed. Patient was instructed and agrees to follow up with dentist, as well as to return to the ED upon further deterioration. Patient is ready to go home. Follow-up Information     Follow up With Details Comments [address] Schedule an appointment as soon as possible for a visit  [facility] [address] [facility] EMERGENCY DEPT  Immediately if symptoms worsen [address] [facility].  [phone]          Current Discharge Medication List      START taking these medications    Details   penicillin v potassium (VEETID) 500 mg tablet Take 1 Tab by mouth four (4) times daily for 7 days. Qty: 28 Tab, Refills: 0    Associated Diagnoses: Pain, dental      HYDROcodone-acetaminophen (NORCO) 5-325 mg per tablet Take 1-2 tablets PO every 4-6 hours as needed for pain control. If over the counter ibuprofen or acetaminophen was suggested, then only take the vicodin for pain not well controlled with the over the counter medication. Qty: 6 Tab, Refills: 0    Associated Diagnoses: Pain, dental               Diagnosis     Clinical Impression:   1.  Pain, dental        Scribe Attestation     Jl Silva acting as a scribe for and in the presence of Sissy Mccallum       February 26, 2018 at 7:15 PM       Provider Attestation:      I personally performed the services described in the documentation, reviewed the documentation, as recorded by the scribe in my presence, and it accurately and completely records my words and actions.  February 26, 2018 at 7:15 PM - Sissy Phelps

## 2018-04-23 DIAGNOSIS — E11.9 DIABETES MELLITUS, STABLE (HCC): ICD-10-CM

## 2019-03-28 ENCOUNTER — OFFICE VISIT (OUTPATIENT)
Dept: INTERNAL MEDICINE CLINIC | Age: 49
End: 2019-03-28

## 2019-03-28 VITALS
BODY MASS INDEX: 48.82 KG/M2 | OXYGEN SATURATION: 99 % | DIASTOLIC BLOOD PRESSURE: 78 MMHG | WEIGHT: 293 LBS | HEIGHT: 65 IN | SYSTOLIC BLOOD PRESSURE: 132 MMHG | TEMPERATURE: 96.4 F | HEART RATE: 76 BPM | RESPIRATION RATE: 18 BRPM

## 2019-03-28 DIAGNOSIS — M25.561 CHRONIC PAIN OF BOTH KNEES: ICD-10-CM

## 2019-03-28 DIAGNOSIS — G89.29 CHRONIC PAIN OF BOTH KNEES: ICD-10-CM

## 2019-03-28 DIAGNOSIS — M25.562 CHRONIC PAIN OF BOTH KNEES: ICD-10-CM

## 2019-03-28 DIAGNOSIS — Z12.39 BREAST CANCER SCREENING: ICD-10-CM

## 2019-03-28 DIAGNOSIS — L72.3 SEBACEOUS CYST: ICD-10-CM

## 2019-03-28 DIAGNOSIS — Z00.00 ROUTINE GENERAL MEDICAL EXAMINATION AT A HEALTH CARE FACILITY: Primary | ICD-10-CM

## 2019-03-28 DIAGNOSIS — Z12.4 CERVICAL CANCER SCREENING: ICD-10-CM

## 2019-03-28 DIAGNOSIS — E11.9 DIABETES MELLITUS, STABLE (HCC): ICD-10-CM

## 2019-03-28 RX ORDER — CHOLECALCIFEROL (VITAMIN D3) 125 MCG
CAPSULE ORAL
COMMUNITY

## 2019-03-28 NOTE — PROGRESS NOTES
ROOM # 1    Cornelio Hale presents today for   Chief Complaint   Patient presents with    Complete Physical    Diabetes     f/u       Cornelio Hale preferred language for health care discussion is english/other. Is someone accompanying this pt? no    Is the patient using any DME equipment during OV? no    Depression Screening:  3 most recent PHQ Screens 10/9/2017 7/19/2016 4/15/2016 2/13/2015   Little interest or pleasure in doing things Not at all Not at all Not at all Not at all   Feeling down, depressed, irritable, or hopeless Not at all Not at all Not at all Not at all   Total Score PHQ 2 0 0 0 0       Learning Assessment:  Learning Assessment 2/13/2015   PRIMARY LEARNER Patient   HIGHEST LEVEL OF EDUCATION - PRIMARY LEARNER  DID NOT GRADUATE [facility] PRIMARY LEARNER NONE   CO-LEARNER CAREGIVER No   PRIMARY LANGUAGE ENGLISH   LEARNER PREFERENCE PRIMARY DEMONSTRATION   ANSWERED BY Patient   RELATIONSHIP SELF       Abuse Screening:  Abuse Screening Questionnaire 10/9/2017   Do you ever feel afraid of your partner? N   Are you in a relationship with someone who physically or mentally threatens you? N   Is it safe for you to go home? Y       Fall Risk  No flowsheet data found. Health Maintenance reviewed and discussed per provider. Yes    Cornelio Hale is due for   Health Maintenance Due   Topic Date Due    FOOT EXAM Q1  04/17/1979    EYE EXAM RETINAL OR DILATED  04/17/1979    DTaP/Tdap/Td series (1 - Tdap) 04/17/1990    HEMOGLOBIN A1C Q6M  10/15/2016    MICROALBUMIN Q1  04/15/2017    LIPID PANEL Q1  04/15/2017    PAP AKA CERVICAL CYTOLOGY  03/18/2018   HM to be d/w provider   Pt. May need referral for eye exam  Pt. To reschedule for pap smear       Please order/place referral if appropriate. Advance Directive:  1. Do you have an advance directive in place? Patient Reply: no    2. If not, would you like material regarding how to put one in place?  Patient Reply: no    Coordination of Care:  1. Have you been to the ER, urgent care clinic since your last visit? Hospitalized since your last visit? no    2. Have you seen or consulted any other health care providers outside of the [address] [place] [address] since your last visit? Include any pap smears or colon screening.  no

## 2019-03-28 NOTE — PATIENT INSTRUCTIONS
1) Follow-up in 6 months or sooner if worsening symptoms. 2) Use heating pad, icy/hot, tiger balm for pain. Knee Arthritis: Exercises  Your Care Instructions  Here are some examples of exercises for knee arthritis. Start each exercise slowly. Ease off the exercise if you start to have pain. Your doctor or physical therapist will tell you when you can start these exercises and which ones will work best for you. How to do the exercises  Knee flexion with heel slide    1. Lie on your back with your knees bent. 2. Slide your heel back by bending your affected knee as far as you can. Then hook your other foot around your ankle to help pull your heel even farther back. 3. Hold for about 6 seconds, then rest for up to 10 seconds. 4. Repeat 8 to 12 times. 5. Switch legs and repeat steps 1 through 4, even if only one knee is sore. Quad sets    1. Sit with your affected leg straight and supported on the floor or a firm bed. Place a small, rolled-up towel under your knee. Your other leg should be bent, with that foot flat on the floor. 2. Tighten the thigh muscles of your affected leg by pressing the back of your knee down into the towel. 3. Hold for about 6 seconds, then rest for up to 10 seconds. 4. Repeat 8 to 12 times. 5. Switch legs and repeat steps 1 through 4, even if only one knee is sore. Straight-leg raises to the front    1. Lie on your back with your good knee bent so that your foot rests flat on the floor. Your affected leg should be straight. Make sure that your low back has a normal curve. You should be able to slip your hand in between the floor and the small of your back, with your palm touching the floor and your back touching the back of your hand. 2. Tighten the thigh muscles in your affected leg by pressing the back of your knee flat down to the floor. Hold your knee straight.   3. Keeping the thigh muscles tight and your leg straight, lift your affected leg up so that your heel is about 12 inches off the floor. Hold for about 6 seconds, then lower slowly. 4. Relax for up to 10 seconds between repetitions. 5. Repeat 8 to 12 times. 6. Switch legs and repeat steps 1 through 5, even if only one knee is sore. Active knee flexion    1. Lie on your stomach with your knees straight. If your kneecap is uncomfortable, roll up a washcloth and put it under your leg just above your kneecap. 2. Lift the foot of your affected leg by bending the knee so that you bring the foot up toward your buttock. If this motion hurts, try it without bending your knee quite as far. This may help you avoid any painful motion. 3. Slowly move your leg up and down. 4. Repeat 8 to 12 times. 5. Switch legs and repeat steps 1 through 4, even if only one knee is sore. Quadriceps stretch (facedown)    1. Lie flat on your stomach, and rest your face on the floor. 2. Wrap a towel or belt strap around the lower part of your affected leg. Then use the towel or belt strap to slowly pull your heel toward your buttock until you feel a stretch. 3. Hold for about 15 to 30 seconds, then relax your leg against the towel or belt strap. 4. Repeat 2 to 4 times. 5. Switch legs and repeat steps 1 through 4, even if only one knee is sore. Stationary exercise bike    1. If you do not have a stationary exercise bike at home, you can find one to ride at your local health club or community center. 2. Adjust the height of the bike seat so that your knee is slightly bent when your leg is extended downward. If your knee hurts when the pedal reaches the top, you can raise the seat so that your knee does not bend as much. 3. Start slowly. At first, try to do 5 to 10 minutes of cycling with little to no resistance. Then increase your time and the resistance bit by bit until you can do 20 to 30 minutes without pain. 4. If you start to have pain, rest your knee until your pain gets back to the level that is normal for you.  Or cycle for less time or with less effort. Follow-up care is a key part of your treatment and safety. Be sure to make and go to all appointments, and call your doctor if you are having problems. It's also a good idea to know your test results and keep a list of the medicines you take. Where can you learn more? Go to http://mahin-megan.info/. Enter C159 in the search box to learn more about \"Knee Arthritis: Exercises. \"  Current as of: September 20, 2018  Content Version: 11.9  © 8648-0581 [employer]. Care instructions adapted under license by [employer] (which disclaims liability or warranty for this information). If you have questions about a medical condition or this instruction, always ask your healthcare professional. [address] any warranty or liability for your use of this information.

## 2019-03-28 NOTE — PROGRESS NOTES
Chief Complaint   Patient presents with    Complete Physical    Diabetes     f/u         HPI:     Josiah Wright is a 51 y.o.  female with history of  Type 2 DM and panic attacks who presents for complete physical exam.    She has bilateral knee pain left greater than right. Constant pain. 20 years. Pain scale: 5-6/10. No radiation down legs or numbness or tingling. She takes aleve for pain. She denies any chest pain, shortness of breath, abdominal pain, headaches or dizziness. Possible sebaceous cyst on left side of chest. No drainage or erythema. Past Medical History:   Diagnosis Date    Panic attacks     Type II or unspecified type diabetes mellitus without mention of complication, not stated as uncontrolled     Vitamin D deficiency 2016       Past Surgical History:   Procedure Laterality Date    HX  SECTION  8381,[id number]    HX GYN      HX HEENT  Childhood    HX TUBAL LIGATION             MEDICATION ALLERGIES/INTOLERANCES:   No Known Allergies          CURRENT MEDICATIONS:    Current Outpatient Medications   Medication Sig    naproxen sodium (ALEVE) 220 mg cap Take  by mouth. Take one po daily as needed for pain. No current facility-administered medications for this visit. Health Maintenance   Topic Date Due    FOOT EXAM Q1  1979    EYE EXAM RETINAL OR DILATED  1979    DTaP/Tdap/Td series (1 - Tdap) 1990    HEMOGLOBIN A1C Q6M  10/15/2016    MICROALBUMIN Q1  04/15/2017    LIPID PANEL Q1  04/15/2017    PAP AKA CERVICAL CYTOLOGY  2018    Influenza Age 5 to Adult  2019 (Originally 2018)    Pneumococcal 0-64 years (1 of 1 - PPSV23) 2020 (Originally 1975)         FAMILY HISTORY:   Family History   Problem Relation Age of Onset    Diabetes Mother     Diabetes Father     Hypertension Sister        SOCIAL HISTORY:   She  reports that she has never smoked.  She has never used smokeless tobacco.  She reports that she does not drink alcohol. HEALTH MAINTENANCE AND SCREENING:  Mammogram: due   Pap smear: due    ROS:   General: negative for - chills, fatigue, fever, weight loss or weight gain, night sweats  HEENT: negative for - no sore throat, nasal congestion, vision problems or ear problems  Resp: negative for - cough, shortness of breath or wheezing  CV: negative for - chest pain, palpitations, orthopnea or PND,  GI: negative for - abdominal pain, change in bowel habits, constipation, diarrhea, blood or black tarry stools, or nausea/vomiting  : negative for - dysuria, hematuria, incontinence, pelvic pain or vulvar/vaginal symptoms  Heme: negative for -excessive bleeding or bruising  Endo: negative for - hot flashes, polydipsia/polyuria or hot or cold intolerance  MSK: negative for -, joint swelling or muscle pain, positive knee pain bilaterally  Neuro: negative for - numbness, tingling, headache or dizziness  Derm: negative for - dry skin, hair changes, rash or skin lesion changes  Psych: negative for - anxiety, depression, irritability or mood swings or insomnia    OBJECTIVE:  PHYSICAL EXAM: Vitals:   Vitals:    03/28/19 1610   BP: 132/78   Pulse: 76   Resp: 18   Temp: 96.4 °F (35.8 °C)   TempSrc: Oral   SpO2: 99%   Weight: 338 lb 6.4 oz (153.5 kg)   Height: 5' 5\" (1.651 m)     Generally: Pleasant female in no acute distress    HEENT exam: Head: atraumatic     Eyes: Pupils equally round and reactive to light, Fundoscopic exam is                       normal               Ears: bilaterally: Normal tympanic membrane, no erythema or exudate,                         normal light Reflex    Nares: moist mucosa, no erythema               Mouth: clear, no erythema or exudate     Neck: supple, no lymphadenopathy, negative thyromegaly, negative                                   carotid bruits bilaterally    Cardiac exam: regular, rate, and rhythm. No murmurs, gallops, or rubs.  Normal S1 and S2.    Pulmonary exam: Clear to ausculation bilaterally    Abdominal exam: Positive bowel sounds in all four quadrants, soft, nondistended, nontender. No hepatosplenomegaly. Extremities: 2+ dorsalis pedis bilaterally. No pedal edema bilaterally. Musculoskeletal exam: 5 out of 5 strength in upper and lower extremities bilaterally. Good range of motion in upper and lower extremities. 2 out of 2 reflexes in upper and lower extremities bilaterally. Neurological exam: Cranial nerves II-XII all intact. Normal sensation in upper and lower extremities. Normal gait. Bilateral breast exam: no masses felt, no TTP, no nipple discharge, bilateral axillae normal, no masses felt    Skin: on left breast:     Bilateral knee exam: no TTP bilaterally, good ROM with flexion and extension with crepitus. LABS/RADIOLOGICAL TESTS:   Lab Results   Component Value Date/Time    WBC 9.4 04/17/2016 02:20 PM    HGB 12.3 04/17/2016 02:20 PM    HCT 38.5 04/17/2016 02:20 PM    PLATELET 344 17/72/7591 02:20 PM     Lab Results   Component Value Date/Time    Sodium 140 04/17/2016 02:10 PM    Potassium 4.3 04/17/2016 02:10 PM    Chloride 103 04/17/2016 02:10 PM    CO2 25 04/17/2016 02:10 PM    Glucose 94 04/17/2016 02:10 PM    BUN 10 04/17/2016 02:10 PM    Creatinine 0.83 04/17/2016 02:10 PM     Lab Results   Component Value Date/Time    Cholesterol, total 177 04/15/2016 09:13 AM    HDL Cholesterol 66 04/15/2016 09:13 AM    LDL, calculated 98 04/15/2016 09:13 AM    Triglyceride 65 04/15/2016 09:13 AM     No results found for: GPT    Previous labs      ASSESSMENT/PLAN:      ICD-10-CM ICD-9-CM    1. Routine general medical examination at a health care facility Z00.00 V70.0 TSH 3RD GENERATION      CBC W/O DIFF      METABOLIC PANEL, COMPREHENSIVE      LIPID PANEL      VITAMIN D, 25 HYDROXY      URINALYSIS W/ RFLX MICROSCOPIC   2. Diabetes mellitus, stable ([facility] [address] E11.9 250.00 We will see what the labs show. Continue diet and exercise. Monitor sugars. HEMOGLOBIN A1C W/O EAG      MICROALBUMIN, UR, RAND W/ MICROALB/CREAT RATIO   3. Chronic pain of both knees M25.561 719.46 Use heating pad, icy/hot, tiger balm . Given exercises. XR KNEES BI AP LAT    M25.562 338.29     G89.29     4. Sebaceous cyst L72.3 706.2 REFERRAL TO DERMATOLOGY   5. Breast cancer screening Z12.31 V76.10 OPAL MAMMO BI SCREENING INCL CAD   6. Cervical cancer screening Z12.4 V76.2 REFERRAL TO OBSTETRICS AND GYNECOLOGY       Requested Prescriptions      No prescriptions requested or ordered in this encounter     Patient verbalized understanding and agreement with the plan. Patient was given after visit summary. Follow-up and Dispositions    · Return in about 6 months (around 9/28/2019) for f/u DM. or sooner if worsening symptoms.         Zain Burns MD

## 2019-04-03 ENCOUNTER — TELEPHONE (OUTPATIENT)
Dept: INTERNAL MEDICINE CLINIC | Age: 49
End: 2019-04-03

## 2019-04-03 DIAGNOSIS — M25.562 PAIN IN BOTH KNEES, UNSPECIFIED CHRONICITY: Primary | ICD-10-CM

## 2019-04-03 DIAGNOSIS — M25.561 PAIN IN BOTH KNEES, UNSPECIFIED CHRONICITY: Primary | ICD-10-CM

## 2019-04-26 ENCOUNTER — OFFICE VISIT (OUTPATIENT)
Dept: INTERNAL MEDICINE CLINIC | Age: 49
End: 2019-04-26

## 2019-04-26 VITALS
RESPIRATION RATE: 16 BRPM | TEMPERATURE: 98.3 F | HEIGHT: 65 IN | HEART RATE: 91 BPM | BODY MASS INDEX: 48.82 KG/M2 | SYSTOLIC BLOOD PRESSURE: 130 MMHG | WEIGHT: 293 LBS | DIASTOLIC BLOOD PRESSURE: 83 MMHG | OXYGEN SATURATION: 98 %

## 2019-04-26 DIAGNOSIS — N95.1 PERIMENOPAUSE: ICD-10-CM

## 2019-04-26 DIAGNOSIS — Z12.39 BREAST CANCER SCREENING: ICD-10-CM

## 2019-04-26 DIAGNOSIS — N89.8 VAGINAL ODOR: ICD-10-CM

## 2019-04-26 DIAGNOSIS — Z12.4 CERVICAL CANCER SCREENING: Primary | ICD-10-CM

## 2019-04-26 NOTE — PATIENT INSTRUCTIONS
[facility]  ph: [phone]    You can go to the [facility] [place] to complete your mammogram. You do not need an appointment. I recommend daily calcium 1200 mg + Vitamin D 800 units. It is best to get calcium from your diet. Give yourself 300 mg from a normal diet. Then give yourself an additional 300 mg for each serving of dairy you consume. Outside of this, take a calcium supplement. For Vit D it is hard to get it from your diet.  You should take over-the-counter Vit D.

## 2019-04-26 NOTE — PROGRESS NOTES
SUBJECTIVE:     47 y.o. female for annual routine Pap and checkup. No LMP recorded. (Menstrual status: Menopause). She reports she still spots intermittently. Overall this is less frequent and less heavy than prior cycles. Social history: Sexually active in a monogamous relationship - recently . She was with other partners prior to this. Agreeable to STD screen with Pap. Review of Systems   Cardiovascular: Positive for leg swelling (chronic, bilateral - c/o OTC compression stockings being too small/tight). Gyn ROS: no breast pain or new or enlarging lumps on self exam; c/o vaginal fishy odor; denies vag discharge. OBJECTIVE:   Visit Vitals  /83 (BP 1 Location: Right arm, BP Patient Position: Sitting)   Pulse 91   Temp 98.3 °F (36.8 °C) (Oral)   Resp 16   Ht 5' 5\" (1.651 m)   Wt 340 lb (154.2 kg)   SpO2 98%   BMI 56.58 kg/m²       Physical Exam   Constitutional: She is oriented to person, place, and time and well-developed, well-nourished, and in no distress. HENT:   Head: Normocephalic and atraumatic. Right Ear: Tympanic membrane, external ear and ear canal normal.   Left Ear: Tympanic membrane, external ear and ear canal normal.   Mouth/Throat: Uvula is midline, oropharynx is clear and moist and mucous membranes are normal. No oropharyngeal exudate, posterior oropharyngeal edema or posterior oropharyngeal erythema. Eyes: Pupils are equal, round, and reactive to light. Conjunctivae are normal. Right eye exhibits no discharge. Left eye exhibits no discharge. No scleral icterus. Neck: Neck supple. No thyroid mass and no thyromegaly present. Cardiovascular: Normal rate, regular rhythm, normal heart sounds and intact distal pulses. Exam reveals no gallop and no friction rub. No murmur heard. Pulses:       Dorsalis pedis pulses are 2+ on the right side, and 2+ on the left side. Posterior tibial pulses are 2+ on the right side, and 2+ on the left side.    Pulmonary/Chest: Effort normal and breath sounds normal. No respiratory distress. She has no decreased breath sounds. She has no wheezes. She has no rhonchi. She has no rales. Abdominal: Normal appearance. Lymphadenopathy:     She has no cervical adenopathy. Neurological: She is alert and oriented to person, place, and time. Skin: Skin is warm and dry. Psychiatric: Affect normal.   Vitals reviewed. BREAST EXAM: breasts appear normal, no suspicious masses, no skin or nipple changes or axillary nodes    PELVIC EXAM: VULVA: normal appearing vulva with no masses, tenderness or lesions, VAGINA: normal appearing vagina with normal color and discharge, no lesions, CERVIX: normal appearing cervix without discharge or lesions, cervix partially visualized due to body habitus - no lesions on visualized portion, UTERUS: uterus difficult to assess d/t body habitus, ADNEXA: difficult to assess due to body habitus, PAP: Pap smear done today, thin-prep method, HPV test, GC/CT/TV, exam chaperoned by Reena Duckworth LPN. ASSESSMENT/PLAN:    Orders Placed This Encounter    NUSWAB VAGINOSIS + CANDIDA     Standing Status:   Future     Number of Occurrences:   1     Standing Expiration Date:   4/26/2020    PAP IG, CT-NG-TV, APTIMA HPV AND RFX 56/85,76([id number])     Standing Status:   Future     Number of Occurrences:   1     Standing Expiration Date:   4/26/2020     Order Specific Question:   Pap Source? Answer:   Endocervical     Order Specific Question:   Total Hysterectomy? Answer:   No     Order Specific Question:   Supracervical Hysterectomy? Answer:   No     Order Specific Question:   Post Menopausal?     Answer:   No     Order Specific Question:   Hormone Therapy? Answer:   No     Order Specific Question:   IUD? Answer:   No     Order Specific Question:   Abnormal Bleeding?      Answer:   Yes     Comments:   perimenopausal spotting     Order Specific Question:   Pregnant     Answer:   No     Order Specific Question:   Post Partum? Answer:   No     Diagnoses and all orders for this visit:    1. Cervical cancer screening  -     PAP IG, CT-NG-TV, APTIMA HPV AND RFX 52/62,72([id number]); Future    2. Vaginal odor  -     NUSWAB VAGINOSIS + CANDIDA; Future    3. Perimenopause    4. Breast cancer screening      Patient Instructions   [facility]  ph: [phone]    You can go to the [facility] to complete your mammogram. You do not need an appointment. I recommend daily calcium 1200 mg + Vitamin D 800 units. It is best to get calcium from your diet. Give yourself 300 mg from a normal diet. Then give yourself an additional 300 mg for each serving of dairy you consume. Outside of this, take a calcium supplement. For Vit D it is hard to get it from your diet. You should take over-the-counter Vit D.     (Patient hadn't heard from derm - appears they tried to call.  Info given for patient to schedule.)      mammogram  pap smear  counseled on breast self exam, mammography screening and adequate intake of calcium and vitamin D  return annually or prn

## 2019-04-28 DIAGNOSIS — Z00.00 ROUTINE GENERAL MEDICAL EXAMINATION AT A HEALTH CARE FACILITY: ICD-10-CM

## 2019-04-28 DIAGNOSIS — E11.9 DIABETES MELLITUS, STABLE (HCC): ICD-10-CM

## 2019-04-30 LAB
BACTERIAL VAGINOSIS, NAA: NEGATIVE
CANDIDA ALBICANS, NAA, 180056: NEGATIVE
CANDIDA GLABRATA, NAA, 180057: NEGATIVE
CANDIDA KRUSEI, NAA, 180016: NEGATIVE
CHLAMYDIA TRACHOMATIS THINPREP, 13342: NEGATIVE
HPV HIGH RISK, 507303: NEGATIVE
NEISSERIA GONORRHOEAE THINPREP, 13343: NEGATIVE
PAP IMAGE GUIDED, 8900296: NORMAL
TRICHOMONAS NUC AMP-THIN PREP,13357: NEGATIVE

## 2019-05-06 NOTE — PROGRESS NOTES
Please notify Ms. Alexx Hill that her Pap smear was abnormal and showed low-grade squamous intraepithelial lesion (LSIL). Her HPV tested negative. Per guidelines, I recommend we repeat both her Pap smear and HPV in 1 year.

## 2019-05-15 NOTE — PROGRESS NOTES
Attempted to contact patient at  number, no answer. Lvm for patient to return call to office at [phone]. Will continue to try to contact patient.

## 2020-03-04 ENCOUNTER — TELEPHONE (OUTPATIENT)
Dept: INTERNAL MEDICINE CLINIC | Age: 50
End: 2020-03-04

## 2020-03-04 NOTE — TELEPHONE ENCOUNTER
Pt requesting pain medication for her knees pt also patient patient experiencing swelling in both knees.

## 2020-03-04 NOTE — TELEPHONE ENCOUNTER
Patient requesting a referral for PT for \" Pain in both knees, unspecified chronicity \" patient would like to go to PT downstairs

## 2020-03-04 NOTE — TELEPHONE ENCOUNTER
This cannot be medically justified as we have never seen her for her knees. She still needs to schedule an appointment for this.

## 2020-03-06 ENCOUNTER — OFFICE VISIT (OUTPATIENT)
Dept: INTERNAL MEDICINE CLINIC | Age: 50
End: 2020-03-06

## 2020-03-06 VITALS
OXYGEN SATURATION: 96 % | SYSTOLIC BLOOD PRESSURE: 141 MMHG | HEART RATE: 87 BPM | TEMPERATURE: 97.8 F | BODY MASS INDEX: 48.82 KG/M2 | WEIGHT: 293 LBS | HEIGHT: 65 IN | DIASTOLIC BLOOD PRESSURE: 87 MMHG

## 2020-03-06 DIAGNOSIS — M17.0 PRIMARY OSTEOARTHRITIS OF BOTH KNEES: ICD-10-CM

## 2020-03-06 DIAGNOSIS — G89.29 BILATERAL CHRONIC KNEE PAIN: Primary | ICD-10-CM

## 2020-03-06 DIAGNOSIS — M25.562 BILATERAL CHRONIC KNEE PAIN: Primary | ICD-10-CM

## 2020-03-06 DIAGNOSIS — M25.561 BILATERAL CHRONIC KNEE PAIN: Primary | ICD-10-CM

## 2020-03-06 RX ORDER — DICLOFENAC SODIUM 10 MG/G
4 GEL TOPICAL 4 TIMES DAILY
Qty: 1000 G | Refills: 2 | Status: SHIPPED | OUTPATIENT
Start: 2020-03-06

## 2020-03-06 NOTE — PROGRESS NOTES
HISTORY OF PRESENT ILLNESS  Romulo Salazar is a 47 y.o. female. HPI  Presents for chronic meg knee pain. R>L. Denies falls, injuries, or trauma. She takes OTC Aleve 2 every few months. Taking Aleve helps significantly for several days. She desires to avoid routine po medication. Knees with advanced medial osteoarthritis. She is interested in PT. She wishes to avoid surgery. XR Results (most recent):  Results from Appointment encounter on 03/28/19   XR KNEES BI AP LAT    Narrative EXAM: XR KNEES BI AP LAT    CLINICAL INDICATION/HISTORY: Left knee and right knee pain      COMPARISON: None. TECHNIQUE: Weightbearing AP, oblique, and lateral views of the right knee and  left knee were obtained.    _______________    FINDINGS:    Right knee: Advanced medial femorotibial compartment degenerative  osteoarthropathy with bone-on-bone abutment and diffuse subchondral sclerosis. There are moderate to large medial compartment marginal osteophytes. No right  knee joint effusion. No acute fracture or dislocation. Periarticular soft  tissues appear within normal limits. Left knee: Advanced medial femorotibial compartment degenerative  osteoarthropathy with bone-on-bone abutment and diffuse subchondral sclerosis. There are moderate to large tricompartmental marginal osteophytes. No left knee  joint effusion. No acute fracture or dislocation. Periarticular soft tissues  appear within normal limits. _______________      Impression IMPRESSION:    No acute abnormality. Asymmetrically prominent degenerative osteoarthropathy  throughout left and right knee medial femorotibial compartments, likely  representing internal derangement, as described. Review of Systems   Cardiovascular: Negative for leg swelling. Musculoskeletal: Positive for joint pain.      Visit Vitals  /87 (BP 1 Location: Right arm, BP Patient Position: Sitting)   Pulse 87   Temp 97.8 °F (36.6 °C)   Ht 5' 5\" (1.651 m)   Wt 347 lb (157.4 kg) SpO2 96%   BMI 57.74 kg/m²     Wt Readings from Last 3 Encounters:   03/06/20 347 lb (157.4 kg)   04/26/19 340 lb (154.2 kg)   03/28/19 338 lb 6.4 oz (153.5 kg)       Physical Exam  Constitutional:       General: She is not in acute distress. Appearance: Normal appearance. She is well-developed. HENT:      Head: Normocephalic and atraumatic. Right Ear: Tympanic membrane, ear canal and external ear normal.      Left Ear: Tympanic membrane, ear canal and external ear normal.      Nose: Nose normal.      Mouth/Throat:      Mouth: Mucous membranes are moist.      Pharynx: Uvula midline. No oropharyngeal exudate or posterior oropharyngeal erythema. Tonsils: No tonsillar abscesses. Eyes:      General: No scleral icterus. Conjunctiva/sclera: Conjunctivae normal.      Pupils: Pupils are equal, round, and reactive to light. Neck:      Musculoskeletal: Neck supple. Cardiovascular:      Rate and Rhythm: Normal rate and regular rhythm. Pulses: Normal pulses. Dorsalis pedis pulses are 2+ on the right side and 2+ on the left side. Posterior tibial pulses are 2+ on the right side and 2+ on the left side. Heart sounds: Normal heart sounds. No murmur. No gallop. Pulmonary:      Effort: Pulmonary effort is normal. No respiratory distress. Breath sounds: Normal breath sounds. No decreased breath sounds, wheezing, rhonchi or rales. Musculoskeletal:      Right knee: She exhibits normal range of motion (pain with full extension). Tenderness found. Medial joint line tenderness noted. No lateral joint line, no MCL, no LCL and no patellar tendon tenderness noted. Left knee: She exhibits normal range of motion (pain with full extension). Tenderness found. Medial joint line tenderness noted. No lateral joint line, no MCL, no LCL and no patellar tendon tenderness noted. Right lower leg: No edema. Left lower leg: No edema.    Lymphadenopathy:      Head:      Right side of head: No submandibular or tonsillar adenopathy. Left side of head: No submandibular or tonsillar adenopathy. Cervical: No cervical adenopathy. Upper Body:      Right upper body: No supraclavicular adenopathy. Left upper body: No supraclavicular adenopathy. Skin:     General: Skin is warm and dry. Neurological:      Mental Status: She is alert and oriented to person, place, and time. Psychiatric:         Speech: Speech normal.         ASSESSMENT and PLAN  Diagnoses and all orders for this visit:    1. Bilateral chronic knee pain  2. Primary osteoarthritis of both knees  -     REFERRAL TO PHYSICAL THERAPY  -     diclofenac (VOLTAREN) 1 % gel; Apply 4 g to affected area four (4) times daily. Apply to meg knees. - She desires to avoid po medication. Will try Diclofenac gel. -     REFERRAL TO SPORTS MEDICINE   - She is specifically interested in injection options. Will consult with Dr. Beverley Ken for his opinion on this. Follow-up and Dispositions    · Return in about 8 weeks (around 5/1/2020) for Hunter Laguna with Pap smear.

## 2020-03-12 ENCOUNTER — HOSPITAL ENCOUNTER (OUTPATIENT)
Dept: PHYSICAL THERAPY | Age: 50
Discharge: HOME | End: 2020-03-12
Payer: MEDICAID

## 2020-03-12 PROCEDURE — 97161 PT EVAL LOW COMPLEX 20 MIN: CPT

## 2020-03-12 NOTE — PROGRESS NOTES
[facility] PHYSICAL THERAPY AT [address] [place], [place]  Phone: [phone] Fax: [phone] / [address] [address] PHYSICAL THERAPY SERVICES  Patient Name: Marti Vidal : 1970   Medical   Diagnosis: Chronic pain of right knee [M25.561, G89.29]  Chronic pain of left knee [M25.562, G89.29] Treatment Diagnosis: (B) knee pain and OA   Onset Date: 15 years     Referral Source: Ping Wolff DO Start of Care [facility]): 3/12/2020   Prior Hospitalization: See medical history Provider #: [id number]   Prior Level of Function: Able to go to grocery store to shop on her own, walking, sustained standing, housecleaning, minimal rest breaks needed   Comorbidities: arthritis   Medications: Verified on Patient Summary List   The Plan of Care and following information is based on the information from the initial evaluation.   ========================================================================  Assessment / key information: Pt is a 46 yo female with chronic knee pain, no injury to note. She does note crepitus (B) when she was younger. 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 Previous rx has included the following: NSAIDs, ice. She presents with pain ranging from 6-10/10, located (B) anterior knee, L lateral knee. Pain is made worse with constant walking, sustained extension, sustained flexion of the knees, waking in the morning, better with Aleve. 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 R SL position at night. Palpation reveals TTP (B) VL and ITB insertion, L Medial joint line. Gait: antalgic with toe out (B).  Comp Trendelenburg, slow pace, decreased TKE   Knee AROM/PROM: L knee 11 to 84/9 to 86. R knee 12 to 80/ 10 to 81. Ankle AROM DF: L 0 deg, R 5 deg. LE MMT: hip flex 4/5 (B), abd 4-/5, ext NT, knee flex 4-/5, ext 4+/5 in available range , ankle 5/5 DF, INv, ever. Patellar mobility is reduced (B). Pt notes (B) ankle edemaHS 90/90 +, [place] + with pain. (+) Special tests include: NI.    Balance assessment: \"Fear of my leg going out and having nothing to hold onto\"  SLS EO 1-2\" (B). Sh Rhomb 10\" (B) with EO. Rhomb EO and EC: 30\".     Pt will benefit from PT interventions to address the aforementioned deficits and allow pt to return to PLOF.    ========================================================================  Eval Complexity: History: MEDIUM  Complexity : 1-2 comorbidities / personal factors will impact the outcome/ POC Exam:HIGH Complexity : 4+ Standardized tests and measures addressing body structure, function, activity limitation and / or participation in recreation  Presentation: LOW Complexity : Stable, uncomplicated  Clinical Decision Making:HIGH Complexity : FOTO score of 1- 25 Overall Complexity:LOW   Problem List: pain affecting function, decrease ROM, decrease strength, edema affecting function, impaired gait/ balance, decrease ADL/ functional abilitiies, decrease activity tolerance, decrease flexibility/ joint mobility and decrease transfer abilities   Treatment Plan may include any combination of the following: Therapeutic exercise, Therapeutic activities, Neuromuscular re-education, Physical agent/modality, Gait/balance training, Manual therapy, Aquatic therapy, Patient education, Self Care training, Functional mobility training, Home safety training and Stair training  Patient / Family readiness to learn indicated by: asking questions, trying to perform skills and interest  Persons(s) to be included in education: patient (P)  Barriers to Learning/Limitations: None  Measures taken:    Patient Goal (s): \"pain management; go for walks, go grocery shopping on my own\"   Patient self reported health status: fair  Rehabilitation Potential: good   Short Term Goals: To be accomplished in  2  treatments:  1. Pt will be independent and compliant with HEP to decrease pain, increase ROM and return pt to PLOF.  Long Term Goals: To be accomplished in  8-12  treatments:  1. Pt will increase score on the FOTO to > or = 48/100 to demo an increase in functional activity tolerance. 2. Pt will have an increase in (B)  knee AROM to 5 to 100 to improve stair negotiation, mobility, ADLs. 3. Pt will be able to self-manage pain to < or = 5/10 ave to improve ease of mobility and self-care  4. Pt will be able to walk for > or = 5 minutes without an increase in pain to begin to restore ADLs and community mobility   Frequency / Duration:   Patient to be seen  1-2  times per week for 8-12  treatments:  Patient / Caregiver education and instruction: self care, activity modification and exercises  Therapist Signature: Zelalem Sims DPT Date: 3/50/6614   Certification Period: Na  Time: 1:23 PM   ========================================================================  I certify that the above Physical Therapy Services are being furnished while the patient is under my care. I agree with the treatment plan and certify that this therapy is necessary. Physician Signature:        Date:       Time:   Please sign and return to [facility] or you may fax the signed copy to [phone]. Thank you. To ensure your patient receives the highest quality care and to avoid disruption in therapy, please sign and return this plan of care within 21 days. Per Medicaid guidelines, if the plan of care is not received within 21 days the patient's care must be put on hold until signed.

## 2020-03-12 NOTE — PROGRESS NOTES
PT DAILY TREATMENT NOTE     Patient Name: Julio Cesar Joe  Date:3/12/2020  : 1970  [x]  Patient  Verified  Payor: Riley Infante / [place] / Product Type: Managed Care Medicaid /    In time:3:00  Out time:3:50  Total Treatment Time (min): 50  Total Timed Codes (min): 20  1:1 Treatment Time (min): 40  (billed eval only today)  Visit #: 1 of     Treatment Area: Chronic pain of right knee [M25.561, G89.29]  Chronic pain of left knee [M25.562, G89.29]    SUBJECTIVE  Pain Level (0-10 scale): 9  Any medication changes, allergies to medications, adverse drug reactions, diagnosis change, or new procedure performed?: [x] No    [] Yes (see summary sheet for update)  Subjective functional status/changes:   [] No changes reported  SEE IE for Subjective Information    OBJECTIVE  Modality rationale: decrease edema, decrease inflammation and decrease pain to improve the patients ability to ambulate, stairs, ADls    Min Type Additional Details   10 [x]  Ice     []  heat  []  Ice massage Position: semireclined  Location: (B) knees after treatment     []  Vasopneumatic Device Pressure:       [] lo [] med [] hi   Temperature: [] lo [] med [] hi   [] Skin assessment post-treatment:  []intact []redness- no adverse reaction       []redness  adverse reaction:       20 min Therapeutic Exercise:  [] See flow sheet : billed eval only today; initiate HS stretch, incline board, quad set, LAQ, hip ball/ band    Rationale: increase ROM, increase strength and improve coordination to improve the patients ability to ambulate, stairs, ADLs             x min Patient Education: [x] Review HEP    [] Progressed/Changed HEP based on:   [] positioning   [] body mechanics   [] transfers   [x] heat/ice application        Other Objective/Functional Measures:   See IE     Pain Level (0-10 scale) post treatment: 7    ASSESSMENT/Changes in Function: see IE,.good response to first treatment and to use of ice for pain management     Patient will continue to benefit from skilled PT services to modify and progress therapeutic interventions, address functional mobility deficits, address ROM deficits, address strength deficits, analyze and address soft tissue restrictions, analyze and cue movement patterns, analyze and modify body mechanics/ergonomics, assess and modify postural abnormalities, address imbalance/dizziness and instruct in home and community integration to attain remaining goals.      [x]  See Plan of Care  []  See progress note/recertification  []  See Discharge Summary         Progress towards goals / Updated goals:  SEE IE FOR GOALS     PLAN  []  Upgrade activities as tolerated     []  Continue plan of care  []  Update interventions per flow sheet       []  Discharge due to:_  [x]  Other:Initiate POC as stated in the IE      Justification for Eval Code Complexity:  Patient History : sedentary, obesity  Examination see iE   Clinical Presentation: stable as (B) knee OA  Clinical Decision Making : FOTO 21/100     Marga Mcfadden DPT 3/12/2020  1:23 PM    Future Appointments   Date Time Provider Florentin Edmonds   3/12/2020  3:00 PM Eleni Driscoll, PT [facility]   4/13/2020  2:40 PM Pillo Rose, [address] [place]   5/20/2020  4:00 PM ДМИТРИЙ Mason [address] [facility]

## 2020-03-20 ENCOUNTER — APPOINTMENT (OUTPATIENT)
Dept: PHYSICAL THERAPY | Age: 50
End: 2020-03-20
Payer: MEDICAID

## 2020-03-24 ENCOUNTER — HOSPITAL ENCOUNTER (OUTPATIENT)
Dept: PHYSICAL THERAPY | Age: 50
End: 2020-03-24
Payer: MEDICAID

## 2020-03-25 ENCOUNTER — TELEPHONE (OUTPATIENT)
Dept: INTERNAL MEDICINE CLINIC | Age: 50
End: 2020-03-25

## 2020-03-30 ENCOUNTER — DOCUMENTATION ONLY (OUTPATIENT)
Dept: INTERNAL MEDICINE CLINIC | Age: 50
End: 2020-03-30

## 2020-03-30 NOTE — PROGRESS NOTES
Prior Authorization for Diclofenac Sodium 1% started on 3/30/2020 via Covermymeds. Awaiting reply from pt's insurance company via fax/e-mail. Allow 48-72 hours.

## 2020-04-06 ENCOUNTER — APPOINTMENT (OUTPATIENT)
Dept: PHYSICAL THERAPY | Age: 50
End: 2020-04-06

## 2020-04-06 ENCOUNTER — DOCUMENTATION ONLY (OUTPATIENT)
Dept: INTERNAL MEDICINE CLINIC | Age: 50
End: 2020-04-06

## 2020-04-08 ENCOUNTER — TELEPHONE (OUTPATIENT)
Dept: ORTHOPEDIC SURGERY | Age: 50
End: 2020-04-08

## 2020-04-08 ENCOUNTER — APPOINTMENT (OUTPATIENT)
Dept: PHYSICAL THERAPY | Age: 50
End: 2020-04-08

## 2020-04-08 NOTE — TELEPHONE ENCOUNTER
Unable to leave message on home number. Attempting to contact to confirm desire to be seen in office, switch to VV, or reschedule to later date.

## 2020-04-13 ENCOUNTER — OFFICE VISIT (OUTPATIENT)
Dept: ORTHOPEDIC SURGERY | Age: 50
End: 2020-04-13

## 2020-04-13 VITALS
RESPIRATION RATE: 17 BRPM | OXYGEN SATURATION: 97 % | BODY MASS INDEX: 48.82 KG/M2 | HEIGHT: 65 IN | SYSTOLIC BLOOD PRESSURE: 148 MMHG | WEIGHT: 293 LBS | DIASTOLIC BLOOD PRESSURE: 93 MMHG | HEART RATE: 82 BPM | TEMPERATURE: 97.9 F

## 2020-04-13 DIAGNOSIS — M17.0 PRIMARY LOCALIZED OSTEOARTHRITIS OF BOTH KNEES: Primary | ICD-10-CM

## 2020-04-13 DIAGNOSIS — E66.01 OBESITY, MORBID (HCC): ICD-10-CM

## 2020-04-13 DIAGNOSIS — M22.2X1 PATELLOFEMORAL DISORDER OF BOTH KNEES: ICD-10-CM

## 2020-04-13 DIAGNOSIS — M22.2X2 PATELLOFEMORAL DISORDER OF BOTH KNEES: ICD-10-CM

## 2020-04-13 RX ORDER — METHYLPREDNISOLONE ACETATE 40 MG/ML
40 INJECTION, SUSPENSION INTRA-ARTICULAR; INTRALESIONAL; INTRAMUSCULAR; SOFT TISSUE ONCE
Qty: 1 VIAL | Refills: 0
Start: 2020-04-13 | End: 2020-04-13

## 2020-04-13 RX ORDER — TRAMADOL HYDROCHLORIDE 50 MG/1
50 TABLET ORAL
Qty: 28 TAB | Refills: 0 | Status: SHIPPED | OUTPATIENT
Start: 2020-04-13 | End: 2020-04-20

## 2020-04-13 RX ORDER — NAPROXEN 500 MG/1
500 TABLET ORAL 2 TIMES DAILY WITH MEALS
Qty: 180 TAB | Refills: 0 | Status: SHIPPED | OUTPATIENT
Start: 2020-04-13

## 2020-04-13 NOTE — PROCEDURES
PROCEDURE NOTE:  Time out: 80am  * Patient was identified by name and date of birth   * Agreement on procedure being performed was verified  * Risks and Benefits explained to the patient  * Procedure site verified and marked as necessary  * Patient was positioned for comfort  * Consent was signed and verified. Risks/benefits including but not limited to bleeding, infection, and scarring discussed and Pt wishes to proceed with procedure. The area was prepped with betadine. Ethyl chloride spray was used, under sterile technique and with ultrasound guidance 1cc of 40mg/cc methylprednisolone acetate and 4cc lidocaine were injected into left knee using lateral approach with 21 gauge needle after confirming synovial fluid aspirated. Sterile gauze used to clean the area. Sterile bandage applied. Blood loss minimal.  Noticed improvement in pain Sx within 5 minutes (now rated 2/10). Identical procedure w/ same medication amounts repeated on right knee after prep w/ betadine. Rated 2-3/10    Tolerated procedure well. Discussed possible signs/Sx of infxn, and advised to seek care if concerned.

## 2020-04-13 NOTE — LETTER
NAME: Daryl Harris : 1970 MRN: [id number] CONSENT FOR TREATMENT/PROCEDURE I authorize Geri Solitario DO at [address] [place] [address] and [facility] to perform the medical treatment and/or procedure(s) described below:  
 
Description of Medical Treatment and/or Procedure(s): (Specify number of treatments or procedures if repeated treatment is recommended) 
 
_____________inject steroid into both knees intraarticular_________________________ I understand my provider may be assisted with significant procedural tasks by other qualified medical practitioners, who may perform important parts of the treatment/procedure(s) or assist with the administration of analgesia (pain-relieving medications) as necessary for my treatment/procedure(s). These practitioners will only perform tasks within the scope of their licensure and practice, as determined under [place] law and any other applicable regulation(s). Name and Credentials of Practitioners Who May Assist with My Treatment/Procedure(s):  
 
______________________________________________________________________ I understand that a medical company representative may be present during my treatment/procedure(s) to observe and provide verbal, technical advice to my provider. I consent to the participation of this representative in my treatment/procedure(s). My provider has explained that unforeseen conditions may be identified during the performance of my treatment/procedure(s) that necessitate an extension of the original treatment/procedure(s) or the performance of procedures other than those identified above. I consent to the performance of additional treatment/procedure(s) by my provider and the qualified medical practitioners assisting my provider as deemed necessary by my provider for treatment of my medical condition(s).   
 
I understand that certain complications may arise during the use of analgesia during my treatment/procedure(s) that may include, but are not limited to, decreased/altered awareness, respiratory problems, drug reactions, paralysis, brain damage, or possibly, death. I understand that the analgesia required for the performance of my treatment/procedure(s) involves additional risks beyond those of the treatment/procedure(s) to be performed, and authorize the use of analgesia by my provider as deemed necessary for the control of pain during my treatment/procedure(s). I understand that my provider may need to change the type of analgesia or medications used, possibly without explanation to me, and I consent to any such changes as deemed necessary by my provider for treatment of my medical condition(s). I understand that there are risks of infection and other unexpected complications associated with any medical or surgical treatment/procedure including the treatment/procedure(s) listed above or other treatment/procedure(s) necessitated by my medical condition, and that such complications may occur in the absence of any negligence on the part of my healthcare providers. My provider has explained to my satisfaction my medical condition and the specific treatment/procedure(s) recommended and identified above. I have been given an opportunity to ask and have answered to my satisfaction questions about: (CONTINUED PAGE 2) NAME: Ilene Grande : 1970 MRN: [id number]  The nature and extent of the treatment/procedure(s) to be performed;  The benefit of treatment, and the risks associated with not having the recommended treatment/procedure(s);  The risks and possible complications associated with having the treatment, including those which, even though unlikely to occur, may involve serious consequences;  
 Analgesia and alternative forms of analgesia;  Alternative procedures and methods of treatment, and the risk associated with each;  
  The expected consequences of the treatment/procedure(s) on my future health. I understand that no assurance can be given that the treatment/procedure(s) performed will be a success, and no guarantee or warranty of success for the treatment/procedure(s) has been given to me by my provider. I consent to the disposal by the examining Pathologist of any tissue removed during my treatment/procedure(s) in accordance with the receiving hospitals or laboratorys policy and any associated regulations specific to such disposal.  
 
I DO_____  DO NOT__x__ consent to other health care personnel observing my treatment/procedure(s) for the purpose of medical education or other specified purposes as may be explained by my provider. I DO_______ DO NOT__x__ consent to photography or videotaping of all or any part of my treatment/procedure(s) for medical and/or educational purposes. I understand that my identity will not be revealed in any photographs, videos, or accompanying explanations should these images be used by my healthcare providers. I certify that I have read and fully understand the above Consent for Treatment/Procedure and that all blanks were completed before I signed this consent.  
 
__________Toni S Lewis_________                      _______________ Print Name of Patient or Legal Representative        Relationship to Patient (if not self) X_______________________________            __________________________ Signature of Patient (or legal representative)  Witness to signature    
 
                       __4/13/2020___/_________AM/PM 
                                                                   Date/Time (If patient is a minor or is unable to sign, complete the following) Patient is a minor, ________ years of age, or is unable to sign due to:______________________ I have explained the nature, purpose and anticipated benefits as well as any possible alternative methods of treatment, the known risks that are involved, and the possibility of complications of the proposed procedure(s) to the patient or patients legal representative. I have provided the patient or legal representative with an opportunity to ask and have answered to their satisfaction any questions about the proposed treatment/procedure(s) and alternative methods of treatment.   
 
Provider Signature:___________________  Date:__4/13/2020__Time:_____________AM/PM

## 2020-04-13 NOTE — PROGRESS NOTES
HISTORY OF PRESENT ILLNESS     Terri Alston 1970 is a 47y.o. year old female comes in today as new patient to be evaluated and treated at the request of Jem Burnett, AS for my opinion/advice regarding: knees bilateral     Patients symptoms have been present for several years. Pain level 7/10 anterior w/ grinding. It has worsened with walking/stairs. Patient has tried: aleve which helps. It is described as pain anterior knees w/o known injury. Was sent to PT but only 1 visit before -19. Could not get Rx voltaren gel approved. IMAGING: XR knees 3/28/19   IMPRESSION:   No acute abnormality. Asymmetrically prominent degenerative osteoarthropathy   throughout left and right knee medial femorotibial compartments, likely   representing internal derangement, as described. Past Surgical History:   Procedure Laterality Date    HX  SECTION  ,    HX GYN      HX HEENT  Childhood    HX TUBAL LIGATION       Social History     Socioeconomic History    Marital status: SINGLE     Spouse name: Not on file    Number of children: Not on file    Years of education: Not on file    Highest education level: Not on file   Occupational History    Not on file   Social Needs    Financial resource strain: Not on file    Food insecurity     Worry: Not on file     Inability: Not on file    Transportation needs     Medical: Not on file     Non-medical: Not on file   Tobacco Use    Smoking status: Never Smoker    Smokeless tobacco: Never Used    Tobacco comment: Pt counseled to continue to not smoke.    Substance and Sexual Activity    Alcohol use: No     Alcohol/week: 0.0 standard drinks    Drug use: No    Sexual activity: Yes     Partners: Male     Birth control/protection: Surgical   Lifestyle    Physical activity     Days per week: Not on file     Minutes per session: Not on file    Stress: Not on file   Relationships    Social connections     Talks on phone: Not on file     Gets together: Not on file     Attends [employer] service: Not on file     Active member of club or organization: Not on file     Attends meetings of clubs or organizations: Not on file     Relationship status: Not on file    Intimate partner violence     Fear of current or ex partner: Not on file     Emotionally abused: Not on file     Physically abused: Not on file     Forced sexual activity: Not on file   Other Topics Concern    Not on file   Social History Narrative    Not on file              Current Outpatient Medications   Medication Sig Dispense Refill    naproxen sodium (ALEVE) 220 mg cap Take by mouth. Take one po daily as needed for pain.  diclofenac (VOLTAREN) 1 % gel Apply 4 g to affected area four (4) times daily. Apply to meg knees. 1000 g 2          Past Medical History:   Diagnosis Date    Panic attacks     Type II or unspecified type diabetes mellitus without mention of complication, not stated as uncontrolled 2015    Vitamin D deficiency 4/2016           Family History   Problem Relation Age of Onset    Diabetes Mother     Diabetes Father     Hypertension Sister      ROS:   + swell, no numb All other systems reviewed and negative aside from that written in the HPI. Objective:   BP (!) 148/93 (BP 1 Location: Left arm, BP Patient Position: Sitting)  Pulse 82  Temp 97.9 °F (36.6 °C) (Oral)  Resp 17  Ht 5' 5\" (1.651 m)  Wt 345 lb (156.5 kg)  SpO2 97%  BMI 57.41 kg/m²   GEN: Appears stated age in NAD. HEAD: Normocephalic, atraumatic. NEURO: Sensation intact light touch B/L lower extremities. MS: LE Strength +5/5 bilateral .   bilateral Knee:   2+ Effusion right, 1+ left . Lachman negative. Valgus negative. Varus negative. negative joint line tenderness medial, lateral. Fracisco negative. Posterior drawer negative. Noble compression test negative. Patellar apprehension negative.  Patellar facet positive tender to palpation medial + crepitance bilateral . Pes anserine negative TTP bilateral   EXT: no clubbing/cyanosis. no edema. SKIN: Warm/dry without rash. HEENT: Conjunctiva/lids WNL. External canals/nares WNL. Tongue midline. PERRL, EOMI. Hearing intact. NECK: Trachea midline. Supple, Full ROM. No thyromegaly. CARDIAC: No edema. LUNGS: Normal effort. ABD: Soft, NT. No HSM. PSYCH: A+O x3. Appropriate judgment and insight. Assessment/Plan:     ICD-10-CM ICD-9-CM    1. Primary localized osteoarthritis of both knees M17.0 715.16 naproxen (NAPROSYN) 500 mg tablet      traMADoL (Ultram) 50 mg tablet      ARTHROCENTESIS ASPIR&/INJ MAJOR JT/BURSA W/US      METHYLPREDNISOLONE ACETATE INJECTION 40 MG      methylPREDNISolone acetate (DEPO-MedroL) 40 mg/mL injection   2. Obesity, morbid ([facility] [address] E66.01 278.01    3. Patellofemoral disorder of both knees M22.2X1 719.96 naproxen (NAPROSYN) 500 mg tablet    M22.2X2  traMADoL (Ultram) 50 mg tablet     Patient (or guardian if minor) verbalizes understanding of evaluation and plan. Inject bilateral today and will Rx short term Ultram PRN severe w/ naproxen PRN w/ food and RTC 4+ months. Hope to resume PT once COVID-19 over.

## 2020-07-01 ENCOUNTER — OFFICE VISIT (OUTPATIENT)
Dept: INTERNAL MEDICINE CLINIC | Age: 50
End: 2020-07-01

## 2020-07-01 VITALS
OXYGEN SATURATION: 96 % | BODY MASS INDEX: 48.82 KG/M2 | HEART RATE: 80 BPM | WEIGHT: 293 LBS | HEIGHT: 65 IN | RESPIRATION RATE: 18 BRPM | TEMPERATURE: 97.7 F | SYSTOLIC BLOOD PRESSURE: 131 MMHG | DIASTOLIC BLOOD PRESSURE: 88 MMHG

## 2020-07-01 DIAGNOSIS — N89.8 VAGINAL ODOR: ICD-10-CM

## 2020-07-01 DIAGNOSIS — N92.1 METRORRHAGIA: ICD-10-CM

## 2020-07-01 DIAGNOSIS — Z12.39 BREAST CANCER SCREENING: ICD-10-CM

## 2020-07-01 DIAGNOSIS — M25.532 LEFT WRIST PAIN: ICD-10-CM

## 2020-07-01 DIAGNOSIS — R87.612 LOW GRADE SQUAMOUS INTRAEPITH LESION ON CYTOLOGIC SMEAR CERVIX (LGSIL): Primary | ICD-10-CM

## 2020-07-01 NOTE — PROGRESS NOTES
Sandra Brown is a 45 y.o. female (: 1970) presenting to address:    Chief Complaint   Patient presents with    Well Woman       Vitals:    20 0840   BP: 131/88   Pulse: 80   Resp: 18   Temp: 97.7 °F (36.5 °C)   TempSrc: Oral   SpO2: 96%   Weight: 342 lb 9.6 oz (155.4 kg)   Height: 5' 5\" (1.651 m)   PainSc:   5   PainLoc: Leg       Hearing/Vision:   No exam data present    Learning Assessment:     Learning Assessment 2015   PRIMARY LEARNER Patient   HIGHEST LEVEL OF EDUCATION - PRIMARY LEARNER  DID NOT GRADUATE HIGH SCHOOL   BARRIERS PRIMARY LEARNER NONE   CO-LEARNER CAREGIVER No   PRIMARY LANGUAGE ENGLISH   LEARNER PREFERENCE PRIMARY DEMONSTRATION   ANSWERED BY Patient   RELATIONSHIP SELF     Depression Screening:     3 most recent PHQ Screens 2020   Little interest or pleasure in doing things Not at all   Feeling down, depressed, irritable, or hopeless Not at all   Total Score PHQ 2 0     Fall Risk Assessment:   No flowsheet data found. Abuse Screening:     Abuse Screening Questionnaire 10/9/2017   Do you ever feel afraid of your partner? N   Are you in a relationship with someone who physically or mentally threatens you? N   Is it safe for you to go home? Y     Coordination of Care Questionaire:   1. Have you been to the ER, urgent care clinic since your last visit? Hospitalized since your last visit? NO    2. Have you seen or consulted any other health care providers outside of the [address] [place] [address] since your last visit? Include any pap smears or colon screening. NO    Advanced Directive:   1. Do you have an Advanced Directive? NO    2. Would you like information on Advanced Directives?  NO

## 2020-07-01 NOTE — PROGRESS NOTES
SUBJECTIVE:     45 y.o. female for 1-year repeat Pap and checkup. No LMP recorded. Patient is perimenopausal.   Reports she still spots intermittently. Overall she feels this is lighter than previous, but she has been bleeding on some level pretty continuously since April 2020. Last Pap 4/26/19 - significant for LSIL with negative HPV test.     Social history: Sexually active in a mutually monogamous relationship. Review of Systems   Musculoskeletal: Positive for joint pain (L wrist, knees). Gyn ROS: no breast pain or new or enlarging lumps on self exam, no discharge or pelvic pain, she complains of continued vaginal spotting, chronic. Admits to chronic vaginal odor. OBJECTIVE:   Visit Vitals  /88 (BP 1 Location: Left arm, BP Patient Position: Sitting)   Pulse 80   Temp 97.7 °F (36.5 °C) (Oral)   Resp 18   Ht 5' 5\" (1.651 m)   Wt 342 lb 9.6 oz (155.4 kg)   SpO2 96%   BMI 57.01 kg/m²       Physical Exam  Vitals signs reviewed. Constitutional:       Appearance: Normal appearance. HENT:      Head: Normocephalic and atraumatic. Right Ear: Tympanic membrane, ear canal and external ear normal.      Left Ear: Tympanic membrane, ear canal and external ear normal.      Mouth/Throat:      Comments: Mask  Eyes:      General: No scleral icterus. Right eye: No discharge. Left eye: No discharge. Conjunctiva/sclera: Conjunctivae normal.      Pupils: Pupils are equal, round, and reactive to light. Neck:      Musculoskeletal: Neck supple. Cardiovascular:      Rate and Rhythm: Normal rate and regular rhythm. Pulses:           Dorsalis pedis pulses are 2+ on the right side and 2+ on the left side. Posterior tibial pulses are 2+ on the right side and 2+ on the left side. Heart sounds: Normal heart sounds. No murmur. No friction rub. No gallop. Pulmonary:      Effort: Pulmonary effort is normal. No respiratory distress.       Breath sounds: Normal breath sounds. No decreased breath sounds, wheezing, rhonchi or rales. Lymphadenopathy:      Cervical: No cervical adenopathy. Skin:     General: Skin is warm and dry. Neurological:      Mental Status: She is alert and oriented to person, place, and time. Psychiatric:         Mood and Affect: Affect normal.         BREAST EXAM: breasts appear normal, no suspicious masses, no skin or nipple changes or axillary nodes    PELVIC EXAM: VULVA: normal appearing vulva with no masses, tenderness or lesions, VAGINA: normal appearing vagina with normal color and discharge, no lesions, some residual blood in the vaginal vault, CERVIX: cervix incompletely visualized but normal-appearing without lesion, UTERUS: uterus is normal size, shape, consistency and nontender, ADNEXA: normal adnexa in size, nontender and no masses, exam limited by body habitus, PAP: Pap smear done today, thin-prep method, HPV test, exam chaperoned by Dawn Toth MA.     Results for orders placed or performed in visit on 04/26/19   NUSWAB VAGINOSIS + CANDIDA   Result Value Ref Range    BACTERIAL VAGINOSIS, EH Negative Negative    C. albicans, EH Negative Negative    C. glabrata, EH Negative Negative    Candida krusei Negative Negative   CHLAM/GC/TRICH NUCL AMP THIN PREP   Result Value Ref Range    CHLAMYDIA TRACHOMATIS THINPREP Negative Negative    NEISSERIA GONORRHOEAE THINPREP Negative Negative    TRICHOMONAS NUC AMP-THIN PREP Negative Negative   HPV HIGH RISK, THIN PREP   Result Value Ref Range    HPV DNA Probe, High Risk Negative Negative   PAP IG (IMAGE GUIDED)   Result Value Ref Range    PAP IMAGE GUIDED         ASSESSMENT/PLAN:    Orders Placed This Encounter    US PELV NON OBS     Standing Status:   Future     Standing Expiration Date:   8/1/2021    [facility] MAMMO BI SCREENING INCL CAD     Standing Status:   Future     Standing Expiration Date:   8/1/2021    NUSWAB VAGINOSIS + CANDIDA     Standing Status:   Future     Standing Expiration Date: 7/2/2021    REFERRAL TO GYNECOLOGY     Referral Priority:   Routine     Referral Type:   Consultation     Referral Reason:   Specialty Services Required     Number of Visits Requested:   1    PAP IG, CT-NG-TV, APTIMA HPV AND [address] 07/30,09([id number])     Standing Status:   Future     Standing Expiration Date:   7/2/2021     Order Specific Question:   Pap Source? Answer:   Endocervical     Order Specific Question:   Total Hysterectomy? Answer:   No     Order Specific Question:   Supracervical Hysterectomy? Answer:   No     Order Specific Question:   Post Menopausal?     Answer:   No     Order Specific Question:   Hormone Therapy? Answer:   No     Order Specific Question:   IUD? Answer:   No     Order Specific Question:   Abnormal Bleeding? Answer:   No     Order Specific Question:   Pregnant     Answer:   No     Order Specific Question:   Post Partum? Answer:   No     Diagnoses and all orders for this visit:    1. Low grade squamous intraepith lesion on cytologic smear cervix (lgsil)  -     PAP IG, CT-NG-TV, APTIMA HPV AND RFX 93/04,95([id number]); Future    2. Metrorrhagia  -     US PELV NON OBS; Future  -     REFERRAL TO GYNECOLOGY  - Given prolonged nature of continuous low-level bleeding, would like to consult with GYN. 3. Breast cancer screening  -     [facility] MAMMO BI SCREENING INCL CAD; Future    4. Left wrist pain  - At conclusion of visit, patient c/o Left wrist pain. She notes the radial styloid as her pain location. C/o associated swelling. Advised I am suspicious of DeQuervain's by location. Advised of wrist brace use to decrease thumb movement and icing. She is due for routine f/u with lab. She will schedule this and will evaluate wrist further at that time. mammogram  pap smear  counseled on breast self exam, mammography screening and menopause    Follow-up and Dispositions    · Return for Schedule combo clinic (30 min).

## 2020-07-03 LAB
CHLAMYDIA TRACHOMATIS THINPREP, 13342: NEGATIVE
HPV HIGH RISK, 507303: NEGATIVE
NEISSERIA GONORRHOEAE THINPREP, 13343: NEGATIVE
PAP IMAGE GUIDED, 8900296: NORMAL
TRICHOMONAS NUC AMP-THIN PREP,13357: NEGATIVE

## 2020-07-06 RX ORDER — METRONIDAZOLE 500 MG/1
500 TABLET ORAL 2 TIMES DAILY
Qty: 14 TAB | Refills: 0 | Status: SHIPPED | OUTPATIENT
Start: 2020-07-06 | End: 2020-07-13

## 2020-07-06 NOTE — PROGRESS NOTES
Please notify Ms. Golden Cisneros that her Pap smear and STD tests were negative. I recommend we repeat in 1 year. It does look like she could have bacterial vaginosis, so I am sending a prescription for Metronidazole to her pharmacy. Take it BID x 7 days. Avoid alcohol completely while taking it. Avoid douching from this point forward as this can cause bacterial vaginosis.

## 2020-07-12 ENCOUNTER — APPOINTMENT (OUTPATIENT)
Dept: GENERAL RADIOLOGY | Age: 50
End: 2020-07-12
Attending: EMERGENCY MEDICINE
Payer: MEDICAID

## 2020-07-12 ENCOUNTER — HOSPITAL ENCOUNTER (EMERGENCY)
Age: 50
Discharge: HOME | End: 2020-07-12
Attending: EMERGENCY MEDICINE
Payer: MEDICAID

## 2020-07-12 VITALS
WEIGHT: 293 LBS | OXYGEN SATURATION: 98 % | HEART RATE: 91 BPM | TEMPERATURE: 97 F | HEIGHT: 65 IN | SYSTOLIC BLOOD PRESSURE: 159 MMHG | RESPIRATION RATE: 16 BRPM | BODY MASS INDEX: 48.82 KG/M2 | DIASTOLIC BLOOD PRESSURE: 98 MMHG
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
## 2020-07-12 DIAGNOSIS — M25.562 ACUTE PAIN OF LEFT KNEE: ICD-10-CM

## 2020-07-12 PROCEDURE — 73564 X-RAY EXAM KNEE 4 OR MORE: CPT

## 2020-07-12 PROCEDURE — 99282 EMERGENCY DEPT VISIT SF MDM: CPT

## 2020-07-12 RX ORDER — IBUPROFEN 600 MG/1
600 TABLET ORAL
Qty: 20 TAB | Refills: 0 | Status: SHIPPED | OUTPATIENT
Start: 2020-07-12

## 2020-07-12 RX ORDER — ACETAMINOPHEN 325 MG/1
650 TABLET ORAL
Qty: 20 TAB | Refills: 0 | Status: SHIPPED | OUTPATIENT
Start: 2020-07-12

## 2020-07-12 RX ORDER — CYCLOBENZAPRINE HCL 10 MG
10 TABLET ORAL
Qty: 15 TAB | Refills: 0 | Status: SHIPPED | OUTPATIENT
Start: 2020-07-12
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
## 2020-07-12 NOTE — LETTER
NOTIFICATION RETURN TO WORK / SCHOOL 
 
7/12/2020 3:07 PM 
 
Ms. Tiffany Davidson [address] [id number] To Whom It May Concern: Tiffany Davidson is currently under the care of [facility] EMERGENCY DEPT. She will return to work/school on: 7- If there are questions or concerns please have the patient contact our office.  
 
 
 
Sincerely, 
 
 
 
Jaz Powell RN
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
## 2020-07-13 ENCOUNTER — PATIENT OUTREACH (OUTPATIENT)
Dept: CASE MANAGEMENT | Age: 50
End: 2020-07-13
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
## 2020-07-16 NOTE — PROGRESS NOTES
[address] [facility] [address] [facility] AT [address]. [place] [address], [place], [place]  Phone: [phone] Fax [phone] SUMMARY  Patient Name: Joanna Patel : 1970   Treatment/Medical Diagnosis: Chronic pain of right knee [M25.561, G89.29]  Chronic pain of left knee [K42.249, G89.29]   Referral Source: Eric Mishra DO     Date of Initial Visit: 3/12/2020 Attended Visits: 1 Missed Visits: 11     SUMMARY OF TREATMENT  Pt is a 46 yo female with chronic knee pain, no injury to note. She does note crepitus (B) when she was younger. 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 Pt was seen for her IE on 3/12/2020 with PT education, stretching, NMREd, TE; CP for pain control and HEP education. CURRENT STATUS  Pt made little progress in therapy due to lack of compliance with attendance and then COVID-19 hold with pt not returning after she was able to. All goals unable to be assessed due to pt only seen for her IE treatment and not returned. Goals for this period include:  · Short Term Goals: To be accomplished in  2  treatments:  1. Pt will be independent and compliant with HEP to decrease pain, increase ROM and return pt to PLOF. · Long Term Goals: To be accomplished in  8-12  treatments:  1. Pt will increase score on the FOTO to > or = 48/100 to demo an increase in functional activity tolerance. 2. Pt will have an increase in (B)  knee AROM to 5 to 100 to improve stair negotiation, mobility, ADLs. 3. Pt will be able to self-manage pain to < or = 5/10 ave to improve ease of mobility and self-care  4. Pt will be able to walk for > or = 5 minutes without an increase in pain to begin to restore ADLs and community mobility     RECOMMENDATIONS  Discontinue therapy due to lack of attendance or compliance. If you have any questions/comments please contact us directly at [phone].    Thank you for allowing us to assist in the care of your patient.   Therapist Signature: Burgess Wilson DPT Date: 7/16/2020      Time: 1:01 PM

## 2020-07-30 ENCOUNTER — PATIENT OUTREACH (OUTPATIENT)
Dept: CASE MANAGEMENT | Age: 50
End: 2020-07-30

## 2020-07-30 NOTE — PROGRESS NOTES
.Patient resolved from Transition of Care episode on 7/30/2020  Discussed COVID-19 related testing which was not done at this time. Test results were not done. Patient informed of results, if available? no     Patient/family has been provided the following resources and education related to COVID-19:                         Signs, symptoms and red flags related to COVID-19            [facility] exposure and quarantine guidelines            [employer] exposure contact - [phone]            Contact for their local Department of Health                 Patient currently reports that the following symptoms have improved:  no new symptoms. No further outreach scheduled with this CTN/ACM/LPN/HC/ MA. Episode of Care resolved. Patient has this CTN/ACM/LPN/HC/MA contact information if future needs arise.

## 2020-08-03 ENCOUNTER — VIRTUAL VISIT (OUTPATIENT)
Dept: INTERNAL MEDICINE CLINIC | Age: 50
End: 2020-08-03

## 2020-08-03 DIAGNOSIS — G89.29 BILATERAL CHRONIC KNEE PAIN: ICD-10-CM

## 2020-08-03 DIAGNOSIS — M54.50 ACUTE MIDLINE LOW BACK PAIN WITHOUT SCIATICA: Primary | ICD-10-CM

## 2020-08-03 DIAGNOSIS — E55.9 VITAMIN D DEFICIENCY: ICD-10-CM

## 2020-08-03 DIAGNOSIS — M25.562 BILATERAL CHRONIC KNEE PAIN: ICD-10-CM

## 2020-08-03 DIAGNOSIS — M17.0 PRIMARY OSTEOARTHRITIS OF BOTH KNEES: ICD-10-CM

## 2020-08-03 DIAGNOSIS — E11.9 TYPE 2 DIABETES MELLITUS WITHOUT COMPLICATION, WITHOUT LONG-TERM CURRENT USE OF INSULIN (HCC): ICD-10-CM

## 2020-08-03 DIAGNOSIS — M25.561 BILATERAL CHRONIC KNEE PAIN: ICD-10-CM
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
## 2020-08-03 NOTE — PATIENT INSTRUCTIONS
Dr. Dilma Hernandez Sports medicine [address] [place] [address], [facility]  
[phone]

## 2020-08-18 NOTE — PROGRESS NOTES
PT DAILY TREATMENT NOTE     Patient Name: Clarisse Mackenzie  Date:2020  : 1970  [x]  Patient  Verified  Payor: Gray Conde / Plan: Nadia [employer] / Product Type: Managed Care Medicaid /    In time:10:45  Out time:11:17   Total Treatment Time (min): 32  Total Timed Codes (min): 0  1:1 Treatment Time (min): 32   Visit #: 1 of     Treatment Area: Acute pain of left knee [M25.562]  Acute low back pain [M54.5]    SUBJECTIVE  Pain Level (0-10 scale): 5  Any medication changes, allergies to medications, adverse drug reactions, diagnosis change, or new procedure performed?: [x] No    [] Yes (see summary sheet for update)  Subjective functional status/changes:   [] No changes reported  SEE IE for Subjective Information    OBJECTIVE      NC min Therapeutic Exercise:  [x] See flow sheet :   Rationale: increase ROM and increase strength to improve the patients ability to walk, stand, improve gait, ADLs          x min Patient Education: [x] Review HEP    [] Progressed/Changed HEP based on:   [] positioning   [] body mechanics   [] transfers   [] heat/ice application        Other Objective/Functional Measures:   Pt arrived on-time to do paperwork for a 10:45 eval start time but requested that she not finish a full 45 min eval as she had to leave for work. Pain Level (0-10 scale) post treatment: 5    ASSESSMENT/Changes in Function: see IE     Patient will continue to benefit from skilled PT services to modify and progress therapeutic interventions, address functional mobility deficits, address ROM deficits, address strength deficits, analyze and address soft tissue restrictions, analyze and cue movement patterns, analyze and modify body mechanics/ergonomics, assess and modify postural abnormalities, address imbalance/dizziness and instruct in home and community integration to attain remaining goals.      [x]  See Plan of Care  []  See progress note/recertification  []  See Discharge Summary Progress towards goals / Updated goals:  SEE IE FOR GOALS     PLAN  []  Upgrade activities as tolerated     []  Continue plan of care  []  Update interventions per flow sheet       []  Discharge due to:_  [x]  Other:Initiate POC as stated in the IE      Justification for Eval Code Complexity:  Patient History : obesity; sedentary, chronic arthritis   Examination see IE  Clinical Presentation: stable  Clinical Decision Making : FOTO 36/100     Willian Bruno DPT 8/18/2020  3:15 PM    Future Appointments   Date Time Provider Florentin Edmonds   8/19/2020 10:45 AM Tiffanie Moncaad, PT [facility]
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
## 2020-08-19 ENCOUNTER — HOSPITAL ENCOUNTER (OUTPATIENT)
Dept: PHYSICAL THERAPY | Age: 50
Discharge: HOME | End: 2020-08-19
Payer: MEDICAID

## 2020-08-19 PROCEDURE — 97161 PT EVAL LOW COMPLEX 20 MIN: CPT

## 2020-09-02 ENCOUNTER — HOSPITAL ENCOUNTER (OUTPATIENT)
Dept: PHYSICAL THERAPY | Age: 50
Discharge: HOME | End: 2020-09-02
Payer: MEDICAID

## 2020-09-02 PROCEDURE — 97110 THERAPEUTIC EXERCISES: CPT

## 2020-09-02 PROCEDURE — 97112 NEUROMUSCULAR REEDUCATION: CPT

## 2020-09-02 PROCEDURE — 97530 THERAPEUTIC ACTIVITIES: CPT

## 2020-09-02 PROCEDURE — 97116 GAIT TRAINING THERAPY: CPT

## 2020-09-02 NOTE — PROGRESS NOTES
PT DAILY TREATMENT NOTE     Patient Name: Real Lot  Date:2020  : 1970  [x]  Patient  Verified  Payor: Veronica Bauer / Plan: [id number] / Product Type: Managed Care Medicaid /    In time: 2:49 pm             Out time: 3:47 pm  Total Treatment Time (min): 58  Visit #: 2 of     Treatment Area: Acute pain of left knee [M25.562]  Acute low back pain [M54.5]    SUBJECTIVE  Pain Level (0-10 scale): 7 in (B) knees  Any medication changes, allergies to medications, adverse drug reactions, diagnosis change, or new procedure performed?: [x] No    [] Yes (see summary sheet for update)  Subjective functional status/changes:   [] No changes reported  Patient notes fair compliance with HEP. She notes pain and stiffness when standing up after sitting, such as when getting out of her car.     OBJECTIVE  Modality rationale: decrease inflammation, decrease pain and increase tissue extensibility to improve the patients ability to perform ADLs     Min Type Additional Details    [] Estim: []Att   []Unatt                  []IFC  []Premod                                            []NMES    []Other:  []w/ice   []w/heat  Position:  Location:    []  Traction: [] Cervical       [] Lumbar                       [] Supine        [] Prone                       []Intermittent   []Continuous Lbs:  [] before manual  [] after manual    []  Ultrasound: []Continuous   [] Pulsed                           []1MHz   []3MHz Location:  W/cm2:    []  Iontophoresis with dexamethasone         Location: [] Take home patch   [] In clinic   10 []  Ice to (B) knees     [x]  Heat to low back  []  Ice massage Position: reclined  Location: see type    []  Vasopneumatic Device Pressure: [] lo [] med [] hi   Temp: [] lo [] med [] hi   [x] Skin assessment post-treatment:  [x]intact    []redness- no adverse reaction                                                                                 []redness  adverse reaction:     18 min Therapeutic Exercise:  [x] See flow sheet: initiated therex per IE   Rationale: increase ROM, increase strength and improve coordination to improve the patients ability to perform ADLs     10 min Therapeutic Activity:  [x]  See flow sheet: initiated TA per IE   Rationale: increase strength, improve coordination, improve balance and increase proprioception  to improve the patients ability to perform transfers     12 min Neuromuscular Re-education:  [x]  See flow sheet: initiated NMRE per IE   Rationale: improve coordination, improve balance and increase proprioception  to improve the patients ability to ambulate on uneven surfaces    8 min Gait Training:  [x]  See flow sheet: SBA (A) required with intermittent min (A) to adjust for 4 LOB   Rationale: increase proprioception, improve balance strategies, increase strength to improve the patient's ability to ambulate with reduced fall risk          with TE min Patient Education: [x] Review HEP from IE; discussed use of standing marches to break up stiffness from prolonged driving prior to negotiating 4 steps into house     Other Objective/Functional Measures:   HEP compliance: fair  Stairs: (B) UE assist, step-over-step pattern for ascent; step-to-step pattern for descent; pt circumducts (B) LEs to clear 6 inch step for proper foot placement during ascent    Pain Level (0-10 scale) post treatment: 4-5 in (B) knees    ASSESSMENT/Changes in Function:   Good tolerance to treatment today with patient req 100% verbal/tactile cueing and demo for proper form/technique with all newly introduced therex.     Patient will continue to benefit from skilled PT services to modify and progress therapeutic interventions, address functional mobility deficits, address ROM deficits, address strength deficits, analyze and address soft tissue restrictions, analyze and cue movement patterns, analyze and modify body mechanics/ergonomics, assess and modify postural abnormalities, address imbalance/dizziness and instruct in home and community integration to attain remaining goals. [x]  See Plan of Care  []  See progress note/recertification  []  See Discharge Summary         Progress towards goals / Updated goals: · Short Term Goals: To be accomplished in  2  treatments:  1. Pt will be independent and compliant with HEP to decrease pain, increase ROM and return pt to PLOF. -Goal progressing; pt notes fair compliance (9/2/20)  · Long Term Goals: To be accomplished in  8-12  treatments:  1. Pt will increase score on the FOTO to > or = 56/100 to demo an increase in functional activity tolerance. 2. Pt will have an increase in (B) knee AROM to >10 to 90 to improve stair negotiation, mobility, ADLs. 3. Pt will be able to self-manage pain to < or = 3/10 ave to improve ease of mobility and self-care  4.  Pt will have an increase in standing and walking tolerance to > or = 15 min to improve self-care and work tolerance    PLAN  [x]  Upgrade activities as tolerated     [x]  Continue plan of care  []  Update interventions per flow sheet       []  Discharge due to:_  [x]  Other: assess response to treatment    Jamila Barney, PTA 9/2/2020

## 2020-09-08 ENCOUNTER — HOSPITAL ENCOUNTER (OUTPATIENT)
Dept: PHYSICAL THERAPY | Age: 50
Discharge: HOME | End: 2020-09-08
Payer: MEDICAID

## 2020-09-08 PROCEDURE — 97116 GAIT TRAINING THERAPY: CPT

## 2020-09-08 PROCEDURE — 97530 THERAPEUTIC ACTIVITIES: CPT

## 2020-09-08 PROCEDURE — 97110 THERAPEUTIC EXERCISES: CPT

## 2020-09-08 PROCEDURE — 97112 NEUROMUSCULAR REEDUCATION: CPT

## 2020-09-08 NOTE — PROGRESS NOTES
PT DAILY TREATMENT NOTE    Patient Name: Mayuri Draft  Date:2020  : 1970  [x]  Patient  Verified  Payor: Alonsojimmy Begin / Plan: [id number] [employer] [place] / Product Type: Managed Care Medicaid /    In time: 4:11 PM  Out time: 5:09 PM  Total Treatment Time (min): 58  Total Timed Codes (min): 48  One-on-One Treatment Time (min): 48  Visit #: 3 of     Treatment Area: Acute pain of left knee [M25.562]  Acute low back pain [M54.5]    SUBJECTIVE  Pain Level (0-10 scale): 5/10 Location: low back, bilat knees  Any medication changes, allergies to medications, adverse drug reactions, diagnosis change, or new procedure performed?: [x] No [] Yes (see summary sheet for update)  Subjective functional status/changes: Pt reports that she did some exercises before she got out of bed today to help with her low back and knee pain. She had her  help her with windshield wipers and leg lifts, which helped.     OBJECTIVE  Modality rationale: decrease pain and increase tissue extensibility to improve the patients ability to progress to functional activities limited by pain or other dysfunction   Min Type Additional Details    [] Estim:   [] IFC   [] Premod   [] NMES   [] Other:     []Att   []Unatt  []TENS instruction Position:   Location:   []w/US   []w/ice   []w/heat    [] Traction: [] Cervical   []Lumbar   [] Prone   []Supine    [] Intermittent   [] Continuous Lbs:   [] before manual  [] after manual    [] Ultrasound   []Continuous   [] Pulsed    Ultrasound Depth: []1MHz   []3MHz Location:   W/cm2:     []  Iontophoresis with dexamethasone Location:   [] Take Home Patch   [] In clinic   10 [x] Ice   [x] Heat   [] Ice massage Position: reclined  Location: MH to low back, CP to bilat knees    []  Vasopneumatic Device Pressure: [] lo [] med [] hi   Temp:    [x] Skin assessment post-treatment:   [x]Intact   []redness- no adverse reaction   []redness  adverse reaction:       14 min Therapeutic Exercise: [x] See Flow Sheet Rationale: increase ROM, increase strength, improve coordination, improve balance, and increase proprioception to improve the patients ability to progress to functional activities limited by pain or other dysfunction     13 min Therapeutic Activity: [x] See Flow Sheet   Rationale: to improve functional activities, model real life movements and performance specific to the patients need with supervision to return the patient to their PLOF      13 min Neuromuscular Re-education: [x] See Flow Sheet   Rationale: exercises designed to improve and/or maintain balance, coordination, kinesthetic sense, posture, and/or proprioception for functional activities to improve the patients ability to function in daily life    8 min Gait Training: [x] See Flow Sheet   Rationale: facilitate gait in all varieties including dynamic movements to improve gait quality in the home and community      Billed As:   [x] TE   [] TA   [] Neuro   [] Self Care min Patient Education: [x] Review HEP       Other Objective/Functional Measures:    Pain Level (0-10 scale) post treatment: 2/10 Location: low back, bilat knees    ASSESSMENT:    WBOS noted with chair squats. Educated pt on analgic stairs negotiation strategies when her R knee is bothering her. Pt verbalized understanding. Pt was motivated t/o session. Pt unable to perform pure tandem walking, but did narrow her CT to challenge balance. Pt adapted a mid guard position t/o gait training. Verbal and tactile cues provided for neutral hip positioning during quad sets. Patient will continue to benefit from skilled PT services to modify and progress therapeutic interventions, address functional mobility deficits, address ROM deficits, address strength deficits, analyze and address soft tissue restrictions, analyze and cue movement patterns, analyze and modify body mechanics/ergonomics, assess and modify postural abnormalities and address imbalance/dizziness to attain remaining goals.      [] See Plan of Care  []  See Progress Note/Re-certification  []  See Discharge Summary      Progress towards goals / Updated goals: · Short Term Goals: To be accomplished in  2  treatments:  1. Pt will be independent and compliant with HEP to decrease pain, increase ROM and return pt to PLOF. -Goal progressing; pt notes fair compliance (9/2/20)  · Long Term Goals: To be accomplished in  8-12  treatments:  1. Pt will increase score on the FOTO to > or = 56/100 to demo an increase in functional activity tolerance. 2. Pt will have an increase in (B) knee AROM to >10 to 90 to improve stair negotiation, mobility, ADLs.    3. Pt will be able to self-manage pain to < or = 3/10 ave to improve ease of mobility and self-care  4. Pt will have an increase in standing and walking tolerance to > or = 15 min to improve self-care and work tolerance    PLAN  [x] Upgrade activities as tolerated   [x] Continue plan of care  [] Update interventions per flow sheet  [] Discharge due to:   [] Other:     Ira Laurent, PT 9/8/2020 4:14 PM    Future Appointments   Date Time Provider Florentin Edmonds   9/8/2020  4:15 PM [facility] LIZZETH 2 [facility]   9/10/2020  5:45 PM Alyse Holt PT [facility]   9/14/2020  5:45 PM Blaine Gram, PTA [facility]   9/16/2020  5:45 PM Blaine Gram, PTA [facility]   9/22/2020  5:45 PM [facility] PT LIZZETH 2 [facility]

## 2020-09-10 ENCOUNTER — HOSPITAL ENCOUNTER (OUTPATIENT)
Dept: PHYSICAL THERAPY | Age: 50
Discharge: HOME | End: 2020-09-10
Payer: MEDICAID

## 2020-09-16 ENCOUNTER — HOSPITAL ENCOUNTER (OUTPATIENT)
Dept: PHYSICAL THERAPY | Age: 50
Discharge: HOME | End: 2020-09-16
Payer: MEDICAID

## 2020-09-16 PROCEDURE — 97110 THERAPEUTIC EXERCISES: CPT

## 2020-09-16 PROCEDURE — 97116 GAIT TRAINING THERAPY: CPT

## 2020-09-16 PROCEDURE — 97112 NEUROMUSCULAR REEDUCATION: CPT

## 2020-09-16 NOTE — PROGRESS NOTES
PT DAILY TREATMENT NOTE    Patient Name: Aman Garner  Date:2020  : 1970  [x]  Patient  Verified  Payor: Jorge Bowman / Plan: Rea Ruelas / Product Type: Managed Care Medicaid /    In time: 6:43 pm                 Out time: 6:40 pm  Total Treatment Time (min): 62  Visit #: 4 of     Treatment Area: Acute pain of left knee [M25.562]  Acute low back pain [M54.5]    SUBJECTIVE  Pain Level (0-10 scale): 7/10 in (B) knees  Any medication changes, allergies to medications, adverse drug reactions, diagnosis change, or new procedure performed?: [x] No [] Yes (see summary sheet for update)  Subjective functional status/changes:   Patient notes inc'd pain from working 2 jobs.     OBJECTIVE  Modality rationale: decrease pain and increase tissue extensibility to improve the patients ability to progress to functional activities limited by pain or other dysfunction   Min Type Additional Details    [] Estim:   [] IFC   [] Premod   [] NMES   [] Other:     []Att   []Unatt  []TENS instruction Position:   Location:   []w/US   []w/ice   []w/heat    [] Traction: [] Cervical   []Lumbar   [] Prone   []Supine    [] Intermittent   [] Continuous Lbs:   [] before manual  [] after manual    [] Ultrasound   []Continuous   [] Pulsed    Ultrasound Depth: []1MHz   []3MHz Location:   W/cm2:     []  Iontophoresis with dexamethasone Location:   [] Take Home Patch   [] In clinic   10 [x] Ice   [x] Heat   [] Ice massage Position: reclined  Location: MH to low back, CP to bilat knees    []  Vasopneumatic Device Pressure: [] lo [] med [] hi   Temp:    [x] Skin assessment post-treatment:   [x]Intact   []redness- no adverse reaction   []redness  adverse reaction:       20 min Therapeutic Exercise: [x] See Flow Sheet   Rationale: increase ROM, increase strength, improve coordination, improve balance, and increase proprioception to improve the patients ability to progress to functional activities limited by pain or other dysfunction     4 min Therapeutic Activity: [x] See Flow Sheet   Rationale: to improve functional activities, model real life movements and performance specific to the patients need with supervision to return the patient to their PLOF      13 min Neuromuscular Re-education: [x] See Flow Sheet   Rationale: exercises designed to improve and/or maintain balance, coordination, kinesthetic sense, posture, and/or proprioception for functional activities to improve the patients ability to function in daily life    10 min Gait Training: [x] See Flow Sheet: added sidestepping and retro walking   Rationale: facilitate gait in all varieties including dynamic movements to improve gait quality in the home and community      Billed As:   [x] TE   [] TA   [] Neuro   [] Self Care min Patient Education: [x] Review HEP       Other Objective/Functional Measures:  HEP compliance: poor, although pt notes performing back HEP during painful morning with significant symptom resolution  FOTO score: 40/100 (at last assessment, 36/100)  Knee AROM: (R) 12-80 deg / (L) 15-70 deg  (B) knee strength: grossly 4+/5  Hip strength: flex (R) 4+/5, (L) 4/5, abd 4/5, ext 2-/5 (unable to achieve full ROM)  Core strength: 25% bridge  Hamstring tightness, 90/90 positioning: (R) +, (L) +  Calf tightness: (R) +, (L) +  L/S flexion AROM: flexion fingertips to 1 inch above TCJ, extension 75%, SB approx 50%, rotation (B) 75%  Pain: (B) 5, (W) 9    Vitals: taken due to patient feeling nauseous  BP: 150/100  SpO2: 99%  HR: 95 bpm    Pain Level (0-10 scale) post treatment: 4    ASSESSMENT:  S/s consistent with mechanical low back pain (as symptoms improved with general stretching today) and chronic (B) knee arthritis.     Patient will continue to benefit from skilled PT services to modify and progress therapeutic interventions, address functional mobility deficits, address ROM deficits, address strength deficits, analyze and address soft tissue restrictions, analyze and cue movement patterns, analyze and modify body mechanics/ergonomics, assess and modify postural abnormalities and address imbalance/dizziness to attain remaining goals. []  See Plan of Care  []  See Progress Note/Re-certification  []  See Discharge Summary      Progress towards goals / Updated goals: · Short Term Goals: To be accomplished in  2  treatments:  1. Pt will be independent and compliant with HEP to decrease pain, increase ROM and return pt to PLOF. -Goal progressing; pt notes fair compliance (9/2/20)  · Long Term Goals: To be accomplished in  8-12  treatments:  1. Pt will increase score on the FOTO to > or = 56/100 to demo an increase in functional activity tolerance. 2. Pt will have an increase in (B) knee AROM to >10 to 90 to improve stair negotiation, mobility, ADLs.    3. Pt will be able to self-manage pain to < or = 3/10 ave to improve ease of mobility and self-care  4. Pt will have an increase in standing and walking tolerance to > or = 15 min to improve self-care and work tolerance    PLAN  [x] Upgrade activities as tolerated   [x] Continue plan of care  [] Update interventions per flow sheet  [] Discharge due to:   [] Other:     Karilyn Siemens, ARPITA 9/16/2020    Future Appointments   Date Time Provider Florentin Edmonds   9/22/2020  5:45 PM [facility] LIZZETH 2 [facility]

## 2020-09-22 ENCOUNTER — HOSPITAL ENCOUNTER (OUTPATIENT)
Dept: PHYSICAL THERAPY | Age: 50
Discharge: HOME | End: 2020-09-22
Payer: MEDICAID

## 2020-09-22 PROCEDURE — 97112 NEUROMUSCULAR REEDUCATION: CPT

## 2020-09-22 PROCEDURE — 97530 THERAPEUTIC ACTIVITIES: CPT

## 2020-09-22 PROCEDURE — 97110 THERAPEUTIC EXERCISES: CPT

## 2020-09-22 PROCEDURE — 97535 SELF CARE MNGMENT TRAINING: CPT

## 2020-09-22 PROCEDURE — 97116 GAIT TRAINING THERAPY: CPT

## 2020-09-22 NOTE — PROGRESS NOTES
[address] [facility] [address] [facility] AT [address] [place] [address]  Phone: [phone] Fax: [phone]  PROGRESS NOTE  Patient Name: Denver Lopez : 1970   Treatment/Medical Diagnosis: Acute pain of left knee [M25.562]  Acute low back pain [M54.5]   Referral Source: [place], [place]     Date of Initial Visit: 20 Attended Visits: 5 Missed Visits: 3     SUMMARY OF TREATMENT  Skilled PT services have consisted of: ther ex, neuro re-ed, gait training, ther act, moist heat, pt education  CURRENT STATUS  Objective Measures from 20 Visit:  HEP compliance: poor, although pt notes performing back HEP during painful morning with significant symptom resolution  Knee AROM: (R) 12-80 deg / (L) 15-70 deg  (B) knee strength: grossly 4+/5  Hip strength: flex (R) 4+/5, (L) 4/5, abd 4/5, ext 2-/5 (unable to achieve full ROM)  Core strength: 25% bridge  Hamstring tightness, 90/90 positioning: (R) +, (L) +  Calf tightness: (R) +, (L) +  L/S flexion AROM: flexion fingertips to 1 inch above TCJ, extension 75%, SB approx 50%, rotation (B) 75%  Pain: (B) 5, (W) 9    Assessment: Pt is demonstrating modest benefit from skilled PT services. Pt extensively educated regarding benefits of HEP adherence and consistent PT sessions to assist with pain/symptom control. Pt verbalized understanding. Pt would benefit from continued skilled PT services 1-2/week x4 weeks before reassess to address impairments, work towards goals, and return to PLOF. Goal/Measure of Progress Goal Met? 1. Pt will be independent and compliant with HEP to decrease pain, increase ROM and return pt to PLOF. Status at Last Eval: New goal Current Status: Poor compliance In progress   2. Pt will increase score on the FOTO to > or = 56/100 to demo an increase in functional activity tolerance. Status at Last Eval: FOTO = 36 Current Status: FOTO = 38 In progress   3.   Pt will have an increase in (B) knee AROM to >10 to 90 to improve stair negotiation, mobility, ADLs. Status at Last Eval: Knee AROM/PROM: L 15 to 80/85. R knee 15 to 65 deg. Current Status: Knee AROM: (R) 12-80 deg / (L) 15-70 deg R knee progressing, no evidence of L knee improvement   4. Pt will be able to self-manage pain to < or = 3/10 to improve ease of mobility and self-care. Status at Last Eval: Pain range 5-9 Current Status: Pain: (B) 5, (W) 9 no   5. Pt will have an increase in standing and walking tolerance to > or = 15 min to improve self-care and work tolerance. Status at Last Eval: Functional Limitations include walking > 2 min; standing for ADLs and work, bending, squatting; don/doff shoes and socks  Current Status: Pt reports continued limitations consistent with IE no     New Goals to be achieved in _4_  weeks:  1) Pt will be independent and compliant with HEP to decrease pain, increase ROM and return pt to PLOF. 2) Pt will increase score on the FOTO to > or = 56/100 to demo an increase in functional activity tolerance. 3) Pt will have an increase in (B) knee AROM to >10 to 90 to improve stair negotiation, mobility, ADLs. 4) Pt will be able to self-manage pain to < or = 3/10 to improve ease of mobility and self-care. 5) Pt will have an increase in standing and walking tolerance to > or = 15 min to improve self-care and work tolerance. RECOMMENDATIONS  Continue seeing pt 1-2x/week x4 more weeks before reassessment. If you have any questions/comments please contact us directly at [phone]   Thank you for allowing us to assist in the care of your patient. Therapist Signature: Adolfo Ding, PT Date: 9/22/2020     Time: 6:16 PM   NOTE TO PHYSICIAN:  PLEASE COMPLETE THE ORDERS BELOW AND FAX TO   [facility] [place]: [phone].   If you are unable to process this request in 24 hours please contact our office: [phone].    ___ I have read the above report and request that my patient continue as recommended.   ___ I have read the above report and request that my patient continue therapy with the following changes/special instructions:_________________________________________________________   ___ I have read the above report and request that my patient be discharged from therapy.      Physician Signature:        Date:       Time:

## 2020-09-29 ENCOUNTER — HOSPITAL ENCOUNTER (OUTPATIENT)
Dept: PHYSICAL THERAPY | Age: 50
Discharge: HOME | End: 2020-09-29
Payer: MEDICAID

## 2020-09-29 PROCEDURE — 97530 THERAPEUTIC ACTIVITIES: CPT

## 2020-09-29 PROCEDURE — 97535 SELF CARE MNGMENT TRAINING: CPT

## 2020-09-29 PROCEDURE — 97112 NEUROMUSCULAR REEDUCATION: CPT

## 2020-09-29 PROCEDURE — 97116 GAIT TRAINING THERAPY: CPT

## 2020-09-29 PROCEDURE — 97110 THERAPEUTIC EXERCISES: CPT

## 2020-09-29 NOTE — PROGRESS NOTES
PT DAILY TREATMENT NOTE    Patient Name: Julissa Or  Date:2020  : 1970  [x]  Patient  Verified  Payor: Gina Fordtigre / Plan: April Turner / Product Type: Managed Care Medicaid /    In time: 2:57 PM  Out time: 4:00 PM  Total Treatment Time (min): 63  Total Timed Codes (min): 53  One-on-One Treatment Time (min): 53  Visit #: (6) 1 of     Treatment Area: Acute pain of left knee [M25.562]  Acute low back pain [M54.5]    SUBJECTIVE  Pain Level (0-10 scale): 5/10 Location: low back, bilat knees  Any medication changes, allergies to medications, adverse drug reactions, diagnosis change, or new procedure performed?: [x] No [] Yes (see summary sheet for update)  Subjective functional status/changes: \"I was so sore over the weekend. I did some of my exercises, which helped. \"    OBJECTIVE  Modality rationale: decrease edema, decrease inflammation, decrease pain and increase tissue extensibility to improve the patients ability to progress to functional activities limited by pain or other dysfunction   Min Type Additional Details    [] Estim:   [] IFC   [] Premod   [] NMES   [] Other:     []Att   []Unatt  []TENS instruction Position:   Location:   []w/US   []w/ice   []w/heat    [] Traction: [] Cervical   []Lumbar   [] Prone   []Supine    [] Intermittent   [] Continuous Lbs:   [] before manual  [] after manual    [] Ultrasound   []Continuous   [] Pulsed    Ultrasound Depth: []1MHz   []3MHz Location:   W/cm2:     []  Iontophoresis with dexamethasone Location:   [] Take Home Patch   [] In clinic    [] Ice   [] Heat   [] Ice massage Position:   Location:     []  Vasopneumatic Device Pressure: [] lo [] med [] hi   Temp:    [] Skin assessment post-treatment:   []Intact   []redness- no adverse reaction   []redness  adverse reaction:       10 min Therapeutic Exercise: [x] See Flow Sheet   Rationale: increase ROM, increase strength, improve coordination, improve balance, and increase proprioception to improve the patients ability to progress to functional activities limited by pain or other dysfunction     13 min Therapeutic Activity: [x] See Flow Sheet   Rationale: to improve functional activities, model real life movements and performance specific to the patients need with supervision to return the patient to their PLOF      10 min Neuromuscular Re-education: [x] See Flow Sheet   Rationale: exercises designed to improve and/or maintain balance, coordination, kinesthetic sense, posture, and/or proprioception for functional activities to improve the patients ability to function in daily life    10 min Gait Training: [x] See Flow Sheet   Rationale: facilitate gait in all varieties including dynamic movements to improve gait quality in the home and community       10    Billed As:   [] TE   [] TA   [] Neuro   [x] Self Care min Patient Education: [x] Review HEP, benefits of physical activity       Other Objective/Functional Measures:    Pain Level (0-10 scale) post treatment: 0/10 Location: low back, bilat knees    ASSESSMENT:    Pt req'd intermittent rest breaks after standing exercises this date. Compensated Trendelenburg noted t/o all ambulation. Commended pt on performing HEP when her symptoms were exacerbated over the weekend and encouraged continuing to perform for improved functional mobility. Pt verbalized understanding. Pt unable to assume pure Tandem walk, able to perform Mod Tandem walk for dynamic stability training. Patient will continue to benefit from skilled PT services to modify and progress therapeutic interventions, address functional mobility deficits, address ROM deficits, address strength deficits, analyze and address soft tissue restrictions, analyze and cue movement patterns, analyze and modify body mechanics/ergonomics, assess and modify postural abnormalities and address imbalance/dizziness to attain remaining goals.      []  See Plan of Care  []  See Progress Note/Re-certification  [] See Discharge Summary      Progress towards goals / Updated goals:  Goals to be achieved in _4_  weeks per PN 9-23-20:  1) Pt will be independent and compliant with HEP to decrease pain, increase ROM and return pt to PLOF. 2) Pt will increase score on the FOTO to > or = 56/100 to demo an increase in functional activity tolerance. 3) Pt will have an increase in (B) knee AROM to >10 to 90 to improve stair negotiation, mobility, ADLs. 4) Pt will be able to self-manage pain to < or = 3/10 to improve ease of mobility and self-care. 5) Pt will have an increase in standing and walking tolerance to > or = 15 min to improve self-care and work tolerance.     PLAN  [x] Upgrade activities as tolerated   [x] Continue plan of care  [] Update interventions per flow sheet  [] Discharge due to:   [] Other:     Eleanor Zapata, PT 9/29/2020 3:14 PM    Future Appointments   Date Time Provider Florentin Edmonds   10/6/2020  2:45 PM [facility] PT ENT 2 [facility]   10/13/2020  3:30 PM [facility] PT GHENT 2 [facility]   10/20/2020  4:15 PM [facility] PT GHENT 2 [facility]

## 2020-10-06 ENCOUNTER — HOSPITAL ENCOUNTER (OUTPATIENT)
Dept: PHYSICAL THERAPY | Age: 50
Discharge: HOME | End: 2020-10-06
Payer: MEDICAID

## 2020-10-06 PROCEDURE — 97110 THERAPEUTIC EXERCISES: CPT

## 2020-10-06 PROCEDURE — 97116 GAIT TRAINING THERAPY: CPT

## 2020-10-06 PROCEDURE — 97112 NEUROMUSCULAR REEDUCATION: CPT

## 2020-10-06 PROCEDURE — 97530 THERAPEUTIC ACTIVITIES: CPT

## 2020-10-06 NOTE — PROGRESS NOTES
PT DAILY TREATMENT NOTE    Patient Name: Randy Gupta  Date:10/6/2020  : 1970  [x]  Patient  Verified  Payor: Greta Boyd / Plan: [id number] / Product Type: Managed Care Medicaid /    In time: 2:45 PM  Out time: 3:40 PM PM  Total Treatment Time (min): 55  Total Timed Codes (min): 45  One-on-One Treatment Time (min): na  Visit #: (7) 2 of     Treatment Area: Acute pain of left knee [M25.562]  Acute low back pain [M54.5]    SUBJECTIVE  Pain Level (0-10 scale): 5/10 Location: low back, bilat knees  Any medication changes, allergies to medications, adverse drug reactions, diagnosis change, or new procedure performed?: [x] No [] Yes (see summary sheet for update)  Subjective functional status/changes: Pt reports that the balance exercises are still challenging, but getting easier. She has continued to perform her HEP at home and has noticed getting off the couch has gotten easier.     OBJECTIVE  Modality rationale: decrease edema, decrease inflammation, decrease pain and increase tissue extensibility to improve the patients ability to progress to functional activities limited by pain or other dysfunction   Min Type Additional Details    [] Estim:   [] IFC   [] Premod   [] NMES   [] Other:     []Att   []Unatt  []TENS instruction Position:   Location:   []w/US   []w/ice   []w/heat    [] Traction: [] Cervical   []Lumbar   [] Prone   []Supine    [] Intermittent   [] Continuous Lbs:   [] before manual  [] after manual    [] Ultrasound   []Continuous   [] Pulsed    Ultrasound Depth: []1MHz   []3MHz Location:   W/cm2:     []  Iontophoresis with dexamethasone Location:   [] Take Home Patch   [] In clinic   10 [x] Ice   [x] Heat   [] Ice massage Position: reclined  Location: MH to low back, CP to bilat knees    []  Vasopneumatic Device Pressure: [] lo [] med [] hi   Temp:    [x] Skin assessment post-treatment:   [x]Intact   []redness- no adverse reaction   []redness  adverse reaction:       10 min Therapeutic Exercise: [x] See Flow Sheet   Rationale: increase ROM, increase strength, improve coordination, improve balance, and increase proprioception to improve the patients ability to progress to functional activities limited by pain or other dysfunction     15 min Therapeutic Activity: [x] See Flow Sheet   Rationale: to improve functional activities, model real life movements and performance specific to the patients need with supervision to return the patient to their PLOF      10 min Neuromuscular Re-education: [x] See Flow Sheet   Rationale: exercises designed to improve and/or maintain balance, coordination, kinesthetic sense, posture, and/or proprioception for functional activities to improve the patients ability to function in daily life    10 min Gait Training: [x] See Flow Sheet   Rationale: facilitate gait in all varieties including dynamic movements to improve gait quality in the home and community       Billed As:   [x] TE   [] TA   [] Neuro   [] Self Care min Patient Education: [x] Review HEP, benefits of physical activity       Other Objective/Functional Measures:    Pain Level (0-10 scale) post treatment: 0/10 Location: low back, bilat knees    ASSESSMENT:    Pt tolerated today's session, continues to req some encouragement during standing exercises. No adverse effects noted with treatment. Commended pt on performing exercises at home and encouraged to continue to continued progress towards program goals. Pt verbalized understanding. Mod tandem still used for dynamic balance. Intermittent seated rest breaks performed to prevent muscular fatigue.     Patient will continue to benefit from skilled PT services to modify and progress therapeutic interventions, address functional mobility deficits, address ROM deficits, address strength deficits, analyze and address soft tissue restrictions, analyze and cue movement patterns, analyze and modify body mechanics/ergonomics, assess and modify postural abnormalities and address imbalance/dizziness to attain remaining goals. []  See Plan of Care  []  See Progress Note/Re-certification  []  See Discharge Summary      Progress towards goals / Updated goals:  Goals to be achieved in _4_  weeks per PN 9-23-20:  1) Pt will be independent and compliant with HEP to decrease pain, increase ROM and return pt to PLOF. Pt performing when symptoms exacerbated 9-29-20  2) Pt will increase score on the FOTO to > or = 56/100 to demo an increase in functional activity tolerance. 3) Pt will have an increase in (B) knee AROM to >10 to 90 to improve stair negotiation, mobility, ADLs. 4) Pt will be able to self-manage pain to < or = 3/10 to improve ease of mobility and self-care. 5) Pt will have an increase in standing and walking tolerance to > or = 15 min to improve self-care and work tolerance.     PLAN  [x] Upgrade activities as tolerated   [x] Continue plan of care  [] Update interventions per flow sheet  [] Discharge due to:   [] Other:     Ivana De La Cruz, PT 10/6/2020 3:14 PM    Future Appointments   Date Time Provider Florentin Edmonds   10/13/2020  3:30 PM [facility] PT LIZZETH 2 [facility]   10/20/2020  4:15 PM [facility] PT GHENT 2 [facility]

## 2020-10-13 ENCOUNTER — HOSPITAL ENCOUNTER (OUTPATIENT)
Dept: PHYSICAL THERAPY | Age: 50
Discharge: HOME | End: 2020-10-13
Payer: MEDICAID

## 2020-10-13 PROCEDURE — 97112 NEUROMUSCULAR REEDUCATION: CPT

## 2020-10-13 PROCEDURE — 97530 THERAPEUTIC ACTIVITIES: CPT

## 2020-10-13 PROCEDURE — 97110 THERAPEUTIC EXERCISES: CPT

## 2020-10-13 PROCEDURE — 97116 GAIT TRAINING THERAPY: CPT

## 2020-10-13 NOTE — PROGRESS NOTES
PT DAILY TREATMENT NOTE    Patient Name: Shaista Quijano  Date:10/13/2020  : 1970  [x]  Patient  Verified  Payor: Enriqueta Llamas / Plan: Angel Gonzalez / Product Type: Managed Care Medicaid /    In time: 331 pm  Out time: 419 pm  Total Treatment Time (min): 48  Total Timed Codes (min): 48  One-on-One Treatment Time (min): na  Visit #: 3 of -8    Treatment Area: Acute pain of left knee [M25.562]  Acute low back pain [M54.5]    SUBJECTIVE  Pain Level (0-10 scale): 6/10 Location: bilat knees  Any medication changes, allergies to medications, adverse drug reactions, diagnosis change, or new procedure performed?: [x] No [] Yes (see summary sheet for update)  Subjective functional status/changes:  \"My legs feel like jello\"    OBJECTIVE    10 min Therapeutic Exercise: [x] See Flow Sheet   Rationale: increase ROM, increase strength, improve coordination, improve balance, and increase proprioception to improve the patients ability to progress to functional activities limited by pain or other dysfunction     18 min Therapeutic Activity: [x] See Flow Sheet   Rationale: to improve functional activities, model real life movements and performance specific to the patients need with supervision to return the patient to their PLOF      10 min Neuromuscular Re-education: [x] See Flow Sheet   Rationale: exercises designed to improve and/or maintain balance, coordination, kinesthetic sense, posture, and/or proprioception for functional activities to improve the patients ability to function in daily life    10 min Gait Training: [x] See Flow Sheet   Rationale: facilitate gait in all varieties including dynamic movements to improve gait quality in the home and community       Throughout tx    Billed As:   [] TE   [] TA   [] Neuro   [] Self Care min Patient Education: [x] Review HEP       Other Objective/Functional Measures:    Pain Level (0-10 scale) post treatment: 010 Location: bilat knees    ASSESSMENT:    Pt tolerated today's session, continues to req some encouragement during standing exercises. No adverse effects noted with treatment. Mod tandem still used for dynamic balance. Intermittent seated rest breaks performed to prevent muscular fatigue. Pt deferred modalities at end of session. Patient will continue to benefit from skilled PT services to modify and progress therapeutic interventions, address functional mobility deficits, address ROM deficits, address strength deficits, analyze and address soft tissue restrictions, analyze and cue movement patterns, analyze and modify body mechanics/ergonomics, assess and modify postural abnormalities and address imbalance/dizziness to attain remaining goals. []  See Plan of Care  []  See Progress Note/Re-certification  []  See Discharge Summary      Progress towards goals / Updated goals:  Goals to be achieved in _4_  weeks per PN 9-23-20:  1) Pt will be independent and compliant with HEP to decrease pain, increase ROM and return pt to PLOF. Pt performing when symptoms exacerbated 9-29-20  2) Pt will increase score on the FOTO to > or = 56/100 to demo an increase in functional activity tolerance. 3) Pt will have an increase in (B) knee AROM to >10 to 90 to improve stair negotiation, mobility, ADLs. 4) Pt will be able to self-manage pain to < or = 3/10 to improve ease of mobility and self-care. 5) Pt will have an increase in standing and walking tolerance to > or = 15 min to improve self-care and work tolerance.     PLAN  [x] Upgrade activities as tolerated   [x] Continue plan of care  [] Update interventions per flow sheet  [] Discharge due to:   [] Other:     Ming Regalado, PT 10/13/2020 3:49 PM    Future Appointments   Date Time Provider Florentin Edmonds   10/20/2020  4:15 PM [facility] PT LIZZETH 2 [facility]

## 2020-10-20 ENCOUNTER — HOSPITAL ENCOUNTER (OUTPATIENT)
Dept: PHYSICAL THERAPY | Age: 50
Discharge: HOME | End: 2020-10-20
Payer: MEDICAID

## 2020-10-20 PROCEDURE — 97116 GAIT TRAINING THERAPY: CPT

## 2020-10-20 PROCEDURE — 97535 SELF CARE MNGMENT TRAINING: CPT

## 2020-10-20 PROCEDURE — 97110 THERAPEUTIC EXERCISES: CPT

## 2020-10-20 PROCEDURE — 97530 THERAPEUTIC ACTIVITIES: CPT

## 2020-10-20 PROCEDURE — 97112 NEUROMUSCULAR REEDUCATION: CPT

## 2020-10-20 NOTE — PROGRESS NOTES
[address] [facility] AT [address], [place], [place] [address]  Phone: [phone] Fax [phone] SUMMARY  Patient Name: Buffy Kitchen : 1970   Treatment/Medical Diagnosis: Acute pain of left knee [M25.562]  Acute low back pain [M54.5]   [facility] [place]     Date of Initial Visit: 20 Attended Visits: 9 Missed Visits: 3     SUMMARY OF TREATMENT  Skilled PT services have consisted of: ther ex, neuro re-ed, gait training, ther act, moist heat, pt education  CURRENT STATUS  Pt has met a plateau in progress, to be DC'd at this time. Goal/Measure of Progress Goal Met? 1. Pt will be independent and compliant with HEP to decrease pain, increase ROM and return pt to PLOF. Status at Last Eval: Poor compliance, although pt notes performing back HEP during painful morning with significant symptom resolution Current Status: Pt performing twice weekly no   2. Pt will increase score on the FOTO to > or = 56/100 to demo an increase in functional activity tolerance. Status at Last Eval: FOTO = 38 Current Status: FOTO = 33 no   3. Pt will have an increase in (B) knee AROM to >10 to 90 to improve stair negotiation, mobility, ADLs. Status at Last Eval: Knee AROM: (R) 12-80 deg / (L) 15-70 deg Current Status: Knee AROM(PROM): flex R 78(78) L 90(90), ext R -20(-10) L -10(-4) no   4. Pt will be able to self-manage pain to < or = 3/10 to improve ease of mobility and self-care. Status at Last Eval: Pain: (B) 5, (W) 9 Current Status: Pain at best: 6  Pain at worst: 9 no   5. Pt will have an increase in standing and walking tolerance to > or = 15 min to improve self-care and work tolerance.    Status at Last Eval: Pt reports continued limitations consistent with IE Current Status: Pt reports being able to tolerate standing for 15 mins yes     RECOMMENDATIONS  Discontinue therapy due to lack of appreciable progress towards goals.    If you have any questions/comments please contact us directly at [phone]. Thank you for allowing us to assist in the care of your patient. Therapist Signature: Eli Arvizu PT Date: 10-20-20   Reporting Period: na Time: 5:27 PM     To ensure we are able to process the patients encounter and avoid risk of your patient receiving a bill for our services, please sign and return this discharge summary by 11-20-20.     Physician Signature:__________________________   Date: _________  Time:__________

## 2020-10-20 NOTE — PROGRESS NOTES
PT DAILY TREATMENT NOTE    Patient Name: Edu Aguiar  Date:10/20/2020  : 1970  [x]  Patient  Verified  Payor: Lorenzo Chacko / Plan: Julius Poe / Product Type: Managed Care Medicaid /    In time: 415 pm  Out time: 522 pm  Total Treatment Time (min): 67  Total Timed Codes (min): 67  One-on-One Treatment Time (min): na  Visit #: 9 of 9    Treatment Area: Acute pain of left knee [M25.562]  Acute low back pain [M54.5]    SUBJECTIVE  Pain Level (0-10 scale): 5/10 Location: bilat knees  Any medication changes, allergies to medications, adverse drug reactions, diagnosis change, or new procedure performed?: [x] No [] Yes (see summary sheet for update)  Subjective functional status/changes: See DC Summary    OBJECTIVE      12 min Therapeutic Exercise: [x] See Flow Sheet   Rationale: increase ROM, increase strength, improve coordination, improve balance, and increase proprioception to improve the patients ability to progress to functional activities limited by pain or other dysfunction     20 min Therapeutic Activity: [x] See Flow Sheet   Rationale: to improve functional activities, model real life movements and performance specific to the patients need with supervision to return the patient to their PLOF      10 min Neuromuscular Re-education: [x] See Flow Sheet   Rationale: exercises designed to improve and/or maintain balance, coordination, kinesthetic sense, posture, and/or proprioception for functional activities to improve the patients ability to function in daily life    10 min Gait Training: [x] See Flow Sheet   Rationale: facilitate gait in all varieties including dynamic movements to improve gait quality in the home and community      15    Billed As:   [] TE   [] TA   [] Neuro   [x] Self Care min Patient Education: [x] Review HEP       Other Objective/Functional Measures:    Pain Level (0-10 scale) post treatment: R knee 7 L knee 5/10    ASSESSMENT:     []  See Plan of Care  []  See Progress Note/Re-certification  [x]  See Discharge Summary      Progress towards goals / Updated goals: See DC Summary    PLAN  [] Upgrade activities as tolerated   [] Continue plan of care  [] Update interventions per flow sheet  [x] Discharge due to: Pt has met a plateau in progress  [] Other:     Alejandrina Shea, PT 10/20/2020 5:34 PM

## 2021-02-02 ENCOUNTER — PATIENT MESSAGE (OUTPATIENT)
Dept: FAMILY MEDICINE CLINIC | Age: 51
End: 2021-02-02